# Patient Record
Sex: FEMALE | Race: WHITE | Employment: OTHER | URBAN - METROPOLITAN AREA
[De-identification: names, ages, dates, MRNs, and addresses within clinical notes are randomized per-mention and may not be internally consistent; named-entity substitution may affect disease eponyms.]

---

## 2017-08-14 ENCOUNTER — OFFICE VISIT (OUTPATIENT)
Dept: ORTHOPEDIC SURGERY | Age: 72
End: 2017-08-14

## 2017-08-14 VITALS — WEIGHT: 229.94 LBS | HEIGHT: 68 IN | BODY MASS INDEX: 34.85 KG/M2

## 2017-08-14 DIAGNOSIS — M75.42 IMPINGEMENT SYNDROME OF LEFT SHOULDER: ICD-10-CM

## 2017-08-14 DIAGNOSIS — M75.02 ADHESIVE CAPSULITIS OF LEFT SHOULDER: ICD-10-CM

## 2017-08-14 DIAGNOSIS — M25.512 LEFT SHOULDER PAIN, UNSPECIFIED CHRONICITY: Primary | ICD-10-CM

## 2017-08-14 PROCEDURE — 4040F PNEUMOC VAC/ADMIN/RCVD: CPT | Performed by: ORTHOPAEDIC SURGERY

## 2017-08-14 PROCEDURE — 99213 OFFICE O/P EST LOW 20 MIN: CPT | Performed by: ORTHOPAEDIC SURGERY

## 2017-08-14 PROCEDURE — 1090F PRES/ABSN URINE INCON ASSESS: CPT | Performed by: ORTHOPAEDIC SURGERY

## 2017-08-14 PROCEDURE — G8427 DOCREV CUR MEDS BY ELIG CLIN: HCPCS | Performed by: ORTHOPAEDIC SURGERY

## 2017-08-14 PROCEDURE — 73030 X-RAY EXAM OF SHOULDER: CPT | Performed by: ORTHOPAEDIC SURGERY

## 2017-08-14 PROCEDURE — 3017F COLORECTAL CA SCREEN DOC REV: CPT | Performed by: ORTHOPAEDIC SURGERY

## 2017-08-14 PROCEDURE — G8417 CALC BMI ABV UP PARAM F/U: HCPCS | Performed by: ORTHOPAEDIC SURGERY

## 2017-08-14 PROCEDURE — 3014F SCREEN MAMMO DOC REV: CPT | Performed by: ORTHOPAEDIC SURGERY

## 2017-08-14 PROCEDURE — G8400 PT W/DXA NO RESULTS DOC: HCPCS | Performed by: ORTHOPAEDIC SURGERY

## 2017-08-14 PROCEDURE — 1123F ACP DISCUSS/DSCN MKR DOCD: CPT | Performed by: ORTHOPAEDIC SURGERY

## 2017-08-14 PROCEDURE — 1036F TOBACCO NON-USER: CPT | Performed by: ORTHOPAEDIC SURGERY

## 2017-11-07 ENCOUNTER — HOSPITAL ENCOUNTER (OUTPATIENT)
Dept: MAMMOGRAPHY | Age: 72
Discharge: HOME | End: 2017-11-07
Attending: OBSTETRICS & GYNECOLOGY | Admitting: OBSTETRICS & GYNECOLOGY

## 2017-11-07 DIAGNOSIS — Z12.31 VISIT FOR SCREENING MAMMOGRAM: ICD-10-CM

## 2018-06-19 ENCOUNTER — OFFICE VISIT (OUTPATIENT)
Dept: ORTHOPEDIC SURGERY | Age: 73
End: 2018-06-19

## 2018-06-19 VITALS — WEIGHT: 220 LBS | HEIGHT: 68 IN | BODY MASS INDEX: 33.34 KG/M2

## 2018-06-19 DIAGNOSIS — S62.102A CLOSED FRACTURE OF LEFT WRIST, INITIAL ENCOUNTER: Primary | ICD-10-CM

## 2018-06-19 PROCEDURE — G8427 DOCREV CUR MEDS BY ELIG CLIN: HCPCS | Performed by: ORTHOPAEDIC SURGERY

## 2018-06-19 PROCEDURE — G8417 CALC BMI ABV UP PARAM F/U: HCPCS | Performed by: ORTHOPAEDIC SURGERY

## 2018-06-19 PROCEDURE — 1090F PRES/ABSN URINE INCON ASSESS: CPT | Performed by: ORTHOPAEDIC SURGERY

## 2018-06-19 PROCEDURE — 3017F COLORECTAL CA SCREEN DOC REV: CPT | Performed by: ORTHOPAEDIC SURGERY

## 2018-06-19 PROCEDURE — 99213 OFFICE O/P EST LOW 20 MIN: CPT | Performed by: ORTHOPAEDIC SURGERY

## 2018-06-19 PROCEDURE — G8400 PT W/DXA NO RESULTS DOC: HCPCS | Performed by: ORTHOPAEDIC SURGERY

## 2018-06-19 PROCEDURE — 25600 CLTX DST RDL FX/EPHYS SEP WO: CPT | Performed by: ORTHOPAEDIC SURGERY

## 2018-06-19 PROCEDURE — L3984 UPPER EXT FX ORTHOSIS WRIST: HCPCS | Performed by: ORTHOPAEDIC SURGERY

## 2018-06-19 PROCEDURE — 4040F PNEUMOC VAC/ADMIN/RCVD: CPT | Performed by: ORTHOPAEDIC SURGERY

## 2018-06-19 PROCEDURE — 1123F ACP DISCUSS/DSCN MKR DOCD: CPT | Performed by: ORTHOPAEDIC SURGERY

## 2018-06-19 PROCEDURE — 1036F TOBACCO NON-USER: CPT | Performed by: ORTHOPAEDIC SURGERY

## 2018-06-19 RX ORDER — OXYCODONE HYDROCHLORIDE AND ACETAMINOPHEN 5; 325 MG/1; MG/1
1 TABLET ORAL EVERY 6 HOURS PRN
Qty: 28 TABLET | Refills: 0 | Status: SHIPPED | OUTPATIENT
Start: 2018-06-19 | End: 2018-06-26

## 2018-06-26 ENCOUNTER — OFFICE VISIT (OUTPATIENT)
Dept: ORTHOPEDIC SURGERY | Age: 73
End: 2018-06-26

## 2018-06-26 VITALS — HEIGHT: 67 IN | WEIGHT: 220.02 LBS | BODY MASS INDEX: 34.53 KG/M2

## 2018-06-26 DIAGNOSIS — M25.532 LEFT WRIST PAIN: Primary | ICD-10-CM

## 2018-06-26 DIAGNOSIS — S62.102D CLOSED FRACTURE OF LEFT WRIST WITH ROUTINE HEALING, SUBSEQUENT ENCOUNTER: ICD-10-CM

## 2018-06-26 PROCEDURE — 99024 POSTOP FOLLOW-UP VISIT: CPT | Performed by: ORTHOPAEDIC SURGERY

## 2018-07-17 ENCOUNTER — OFFICE VISIT (OUTPATIENT)
Dept: ORTHOPEDIC SURGERY | Age: 73
End: 2018-07-17

## 2018-07-17 VITALS — HEIGHT: 68 IN | BODY MASS INDEX: 34.86 KG/M2 | WEIGHT: 230 LBS

## 2018-07-17 DIAGNOSIS — S62.102D CLOSED FRACTURE OF LEFT WRIST WITH ROUTINE HEALING, SUBSEQUENT ENCOUNTER: ICD-10-CM

## 2018-07-17 DIAGNOSIS — M25.532 LEFT WRIST PAIN: Primary | ICD-10-CM

## 2018-07-17 PROCEDURE — 99024 POSTOP FOLLOW-UP VISIT: CPT | Performed by: ORTHOPAEDIC SURGERY

## 2018-07-17 RX ORDER — METHYLPREDNISOLONE 4 MG/1
TABLET ORAL
Qty: 1 KIT | Refills: 0 | Status: SHIPPED | OUTPATIENT
Start: 2018-07-17 | End: 2018-07-23

## 2018-07-17 NOTE — PROGRESS NOTES
may consider EMG testing. Brace adjustment today. We have also ordered therapy for a hand and finger range of motion program to include modalities. Her finger stiffness is of quite concerned today area we have discussed moving the fingers previously. This was enforced once again today. He treatments along with range of motion active and passive at home. No strengthening at this time. Follow-up in 1 month unless needed sooner. Repeat x-ray left wrist.  All questions and concerns were addressed today. Patient is in agreement with the plan. Sanam Lynne MD  Hand & Upper Extremity Surgery  [address] [place] [address]  A partner of [facility] [address]        Please note that this transcription was created using voice recognition software. Any errors are unintentional and may be due to voice recognition transcription.

## 2018-07-30 ENCOUNTER — HOSPITAL ENCOUNTER (OUTPATIENT)
Dept: OCCUPATIONAL THERAPY | Age: 73
Setting detail: THERAPIES SERIES
Discharge: HOME | End: 2018-07-30
Payer: MEDICARE

## 2018-07-30 PROCEDURE — 97110 THERAPEUTIC EXERCISES: CPT | Performed by: OCCUPATIONAL THERAPIST

## 2018-07-30 PROCEDURE — G8985 CARRY GOAL STATUS: HCPCS | Performed by: OCCUPATIONAL THERAPIST

## 2018-07-30 PROCEDURE — G8984 CARRY CURRENT STATUS: HCPCS | Performed by: OCCUPATIONAL THERAPIST

## 2018-07-30 PROCEDURE — 97165 OT EVAL LOW COMPLEX 30 MIN: CPT | Performed by: OCCUPATIONAL THERAPIST

## 2018-07-30 NOTE — PLAN OF CARE
graded HEP progression with a good level of effort and compliance. 2) Pt to report a score of 19 % or less on the Quick DASH disability questionnaire for increased performance with carrying, moving, and handling objects. 3) Pt will demonstrate increased ROM to FINGERTIPS touching palm, wrist 50/50 for improved ability to resume driving, lift a pot from the stove. 4) Pt will demonstrate increased strength to  50% of right for improved  and lift items such as a pan. 5) Pt will have a decrease in pain to 2/10 to facilitate improvement in ability to resume driving, lift a pot from the stove. OCCUPATIONAL THERAPY EVALUATION COMPLEXITY JUSTIFICATION:    [x] An occupational profile and medical/therapy history, which includes:   [x] a brief history including medical and/or therapy records relating to the     presenting problem   [] an expanded review of medical and/or therapy records and additional review     of physical, cognitive or psychosocial history related to current functional    performance   [] an extensive additional review of review of medical and/or therapy records   and physical, cognitive, or psychosocial history related to current    functional performance    [x] An assessment that identifies performance deficits (relating to physical, cognitive, or psychosocial skills) that result in activity limitations and/or participation restrictions:   [x] 1-3 performance deficits   [] 3-5 performance deficits   [] 5 or more performance deficits    [x] Clinical decision making of:   [x] low complexity, including analysis of occupational profile, data analysis from problem focused assessment, and consideration of a limited number of treatment options. No comorbidities affect occupational performance. No task modifications or assistance needed to complete evaluation.    [] moderate complexity, including analysis of occupational profile, data analysis from detailed assessment and consideration of several treatment options. Comorbidities that affect occupational performance may be present. Minimal to moderate task modifications or assistance needed to complete assessment. [] high complexity, including analysis of occupational profile, analysis of data from comprehensive assessment and consideration of multiple treatment options. Multiple comorbidities present that affect occupational performance. Significant task modifications or assistance needed to complete assessment.     Evaluation Code:  [x] Low Complexity EVAL 88440 (typically 30 minutes face to face)  [] Mod Complexity EVAL 23399 (typically 45 minutes face to face)  [] High Complexity EVAL 10609 (typically 60 minutes face to face)    Electronically signed by:  Valerio Espino OTR/L, CHT [id number]

## 2018-07-30 NOTE — FLOWSHEET NOTE
[facility], [address] [place] [address]  Phone: [phone]  Fax [phone]  Hand Therapy Daily Treatment Note  Date:  2018    Patient: Moreno Amos   : 1945   MRN: [id number]  Referring Physician: Referring Practitioner: Ian Packer MD       Medical Diagnosis Information:   Diagnosis: S62.102D (ICD-10-CM) - Closed fracture of left wrist with routine healing, subsequent encounter   Treatment Diagnosis: M25.532 (ICD-10-CM) - Left wrist pain     Date of injury:6/15/18  Date of Surgery/Type of procedure: Insurance information:OT Insurance Information: Medicare,    Comorbidities Affecting Functional Performance:     []Anxiety (F41.9)/Depression (F32.9)   []Diabetes Type 1(E10.65) or 2 (E11.65)   []Rheumatoid Arthritis (M05.9)  []Fibromyalgia (M79.7)  []Neuropathy(G60.9)  [x]Osteoarthritis(M19.91)  []None   []Other:    G-code: OT G-codes  Functional Assessment Tool Used: QDASH APPLIED ON 18  Score: 48%  Functional Limitation: Carrying, moving and handling objects  Carrying, Moving and Handling Objects Current Status (): At least 40 percent but less than 60 percent impaired, limited or restricted  Carrying, Moving and Handling Objects Goal Status ():  At least 1 percent but less than 20 percent impaired, limited or restricted    Date of Patient follow up with Physician:  Preferred Language for Healthcare:   [x]English       []other:  Latex Allergy:  [x]NO      []YES  RESTRICTIONS/PRECAUTIONS:  Has  A fib but no pacemaker, progress to prom slowly as pat is fearful- 18      Progress Note: []  Yes  []  No  Next due by : Visit #10       Visit # Insurance Allowable Requires auth   1 medicare    no:[x]                  yes:[]    From 18 to 2018    Pain level: 5 /10     SUBJECTIVE:  Background/Relevant Medical & Therapy History: Patient fell walking through a restaurant at [facility] had immediate pain followed by swelling and bruising and weakness of the left hand and wrist.  reports that all fingers are numb. Skin macerated in thenar crease, patient did not have the orthosis in proper position when she came in, was too far distal and pushing on her 1st cmc which is very painful. She was able to put on properly with instructions prior to leaving today. painful to gently abduct thumb out of palm. Can barely flex her fingers or thumb today today.       OBJECTIVE:    Date:  Hand Dominance:     [x]  Right    [] Left 2018      Objective Measures:     PAIN /10   Quick DASH 48%   Digit  ROM         Index       MP:  PIP:  DIP:                              Long MP:  PIP:  DIP                              Ring MP:  PIP:  DIP                              Small MP:  PIP:  DIP   Digits tip to DPFC in cm Index:10  Lon  Ring:10  Small:9   Thumb ROM Severe limitation   Thumb opposition  unable   Thumb Radial/Palmar abd ROM Limited likely due to long term cmc OA   Wrist ROM Ext/Flex R:65/75  L:35/30   Rad/Uln dev ROM R:  L:   Forearm ROM  Sup/pron R:70/80  L:35/30   Elbow ROM Ext/flex R:  L:not assessed   Shoulder Flex  Shoulder Abd  Shoulder IR/ER     Edema in cm circumf. Index p1 R:6.2  L:7.1   Edema in cm circumf. Wrist R:18.2  L:15.8    strength in lbs R:  L:deferred   Pinch Strengthin lbs: lat  R:  L:   Pinch Strength in lbs:  3 point R:  L:      MMT:           Modalities: 18  Warm water soak              Therapeutic Exercise & Activities:     Foam roll, prom arom digits, arom wrist HEP                                                   Therapeutic Exercise and NMR EXR  [x] (52679) Provided verbal/tactile cueing for activities related to strengthening, flexibility, endurance, ROM  for improvements in scapular, scapulothoracic and UE control with self care, reaching, carrying, lifting, house/yardwork, driving/computer work.     [] No    PLAN: Recommend Occupational Therapy 2 times a week for 12 weeks  [] Continue per plan of care [] Alter current plan (see comments)  [x] Plan of care initiated [] Hold pending MD visit [] Discharge    Plan for next session: progress rom, pain control   Thermal modalities, functional activities and strengthening as inicated, AROM, PROM as indicated    Electronically signed by: Freddie Silverio OTR/L, [address] [place], [place] [id number]

## 2018-08-01 ENCOUNTER — HOSPITAL ENCOUNTER (OUTPATIENT)
Dept: OCCUPATIONAL THERAPY | Age: 73
Setting detail: THERAPIES SERIES
Discharge: HOME | End: 2018-08-01
Payer: MEDICARE

## 2018-08-01 PROCEDURE — 97140 MANUAL THERAPY 1/> REGIONS: CPT | Performed by: OCCUPATIONAL THERAPIST

## 2018-08-01 PROCEDURE — 97112 NEUROMUSCULAR REEDUCATION: CPT | Performed by: OCCUPATIONAL THERAPIST

## 2018-08-01 PROCEDURE — 97022 WHIRLPOOL THERAPY: CPT | Performed by: OCCUPATIONAL THERAPIST

## 2018-08-01 PROCEDURE — 97110 THERAPEUTIC EXERCISES: CPT | Performed by: OCCUPATIONAL THERAPIST

## 2018-08-01 NOTE — FLOWSHEET NOTE
[facility], [address] [place] [address] [place]  Phone: [phone]  Fax [phone]  Hand Therapy Daily Treatment Note  Date:  2018    Patient: Manish Pascal   : 1945   MRN: [id number]  Referring Physician: Referring Practitioner: Naseem Malave MD       Medical Diagnosis Information:   Diagnosis: S62.102D (ICD-10-CM) - Closed fracture of left wrist with routine healing, subsequent encounter   Treatment Diagnosis: M25.532 (ICD-10-CM) - Left wrist pain     Date of injury:6/15/18  Date of Surgery/Type of procedure: Insurance information:OT Insurance Information: Medicare, [facility]   Comorbidities Affecting Functional Performance:     []Anxiety (F41.9)/Depression (F32.9)   []Diabetes Type 1(E10.65) or 2 (E11.65)   []Rheumatoid Arthritis (M05.9)  []Fibromyalgia (M79.7)  []Neuropathy(G60.9)  [x]Osteoarthritis(M19.91)  []None   []Other:    G-code: OT G-codes  Functional Assessment Tool Used: QDASH APPLIED ON 18  Score: 48%  Functional Limitation: Carrying, moving and handling objects  Carrying, Moving and Handling Objects Current Status (): At least 40 percent but less than 60 percent impaired, limited or restricted  Carrying, Moving and Handling Objects Goal Status (): At least 1 percent but less than 20 percent impaired, limited or restricted    Date of Patient follow up with Physician:  Preferred Language for Healthcare:   [x]English       []other:  Latex Allergy:  [x]NO      []YES  RESTRICTIONS/PRECAUTIONS:  Has  A fib but no pacemaker, progress to prom slowly as pat is fearful- 18      Progress Note: []  Yes  []  No  Next due by : Visit #10       Visit # Insurance Allowable Requires auth   2 medicare    no:[x]                  yes:[]    From 18 to 2018    Pain level: 5 /10     SUBJECTIVE:    She still is not putting on her orthosis properly.  Is wearing it listed. [] Progression is slowed due to complexities listed. [] Progression has been slowed due to co-morbidities.   [] Plan just implemented, too soon to assess goals progression  [x] Other: slow progression    ASSESSMENT:    Treatment/Activity Tolerance:  [] Patient tolerated treatment well [] Patient limited by fatique  [x] Patient limited by pain  [] Patient limited by other medical complications  [] Other:     Prognosis: [x] Good [] Fair  [] Poor    Patient Requires Follow-up: [x] Yes  [] No    PLAN: Recommend Occupational Therapy 2 times a week for 11 weeks  [] Continue per plan of care [] Alter current plan (see comments)  [x] Plan of care initiated [] Hold pending MD visit [] Discharge    Plan for next session: progress rom, pain control   Thermal modalities, functional activities and strengthening as inicated, AROM, PROM as indicated    Electronically signed by: Meriel Gitelman OTR/L, [address] [place] [address] [id number]

## 2018-08-06 ENCOUNTER — HOSPITAL ENCOUNTER (OUTPATIENT)
Dept: OCCUPATIONAL THERAPY | Age: 73
Setting detail: THERAPIES SERIES
Discharge: HOME | End: 2018-08-06
Payer: MEDICARE

## 2018-08-06 PROCEDURE — 97140 MANUAL THERAPY 1/> REGIONS: CPT | Performed by: OCCUPATIONAL THERAPIST

## 2018-08-06 PROCEDURE — 97112 NEUROMUSCULAR REEDUCATION: CPT | Performed by: OCCUPATIONAL THERAPIST

## 2018-08-06 PROCEDURE — 97022 WHIRLPOOL THERAPY: CPT | Performed by: OCCUPATIONAL THERAPIST

## 2018-08-06 PROCEDURE — 97110 THERAPEUTIC EXERCISES: CPT | Performed by: OCCUPATIONAL THERAPIST

## 2018-08-06 NOTE — FLOWSHEET NOTE
will know the proper placement of the orthosis. She continues to put it on improperly in spite of being instructed each therapy visit. Thumb and fingers very edematous and part of that is from improper placement by patient of exos orthosis. Therapist reviewed again, she is still wearing it too distal and it is pressing on the cmc area of her thumb, which is already arthritic and painful. Background/Relevant Medical & Therapy History: Patient fell walking through a restaurant at [facility] had immediate pain followed by swelling and bruising and weakness of the left hand and wrist.  reports that all fingers are numb. Skin macerated in thenar crease, patient did not have the orthosis in proper position when she came in, was too far distal and pushing on her 1st cmc which is very painful. She was able to put on properly with instructions prior to leaving today. painful to gently abduct thumb out of palm. Can barely flex her fingers or thumb today today.       OBJECTIVE:    Date:  Hand Dominance:     [x]  Right    [] Left 2018    Objective Measures:      PAIN 5/10    Quick DASH 48%    Digit  ROM         Index       MP:  PIP:  DIP: /45  /35  /05                              Long MP:  PIP:  DIP /55  /15  /+15                              Ring MP:  PIP:  DIP /30  /35  /+15                              Small MP:  PIP:  DIP /20  /50  /10   Digits tip to DPFC in cm Index:10  Lon  Ring:10  Small:9 9  10  8  7.5   Thumb ROM Severe limitation    Thumb opposition  unable    Thumb Radial/Palmar abd ROM Limited likely due to long term cmc OA    Wrist ROM Ext/Flex R:65/75  L:35/30   45/35   Rad/Uln dev ROM R:  L:    Forearm ROM  Sup/pron R:70/80  L:35/30    Elbow ROM Ext/flex R:  L:not assessed    Shoulder Flex  Shoulder Abd  Shoulder IR/ER      Edema in cm circumf. Index p1 R:6.2  L:7.1    Edema in cm circumf.   Wrist R:18.2  L:15.8     strength in lbs R:  L:deferred    Pinch Strengthin lbs: with self care, reaching, carrying, lifting, house/yardwork, driving/computer work    Manual Treatments: retro grade and gentle soft tissue mobs hand 8/6/18     [x] ([id number]) Provided manual therapy to mobilize soft tissue/joints of the UE for the purpose of modulating pain, promoting relaxation,  increasing ROM, reducing/eliminating soft tissue swelling/inflammation/restriction, improving soft tissue extensibility and allowing for proper ROM for normal function with self care, reaching, carrying, lifting, house/yardwork, driving/computer work    Splinting:  [] Fabrication of: L-code-finger flexion cuff, used stockinet and velcro to flex all fingers at the same time.   [] (22900) Checkout for orthotic/prosthetic use, established patient   [] (64467) Orthotic management and training (fitting and assessment)  [x] Comments:helped her put on exos brace properly again, marked proximal forearm with a sharpie pen to ensure proper fit 8/6/18   helped her put on exos brace properly 8/1/18     Charges:  Timed Code Treatment Minutes: 45   Total Treatment Minutes: 60   [] EVAL (LOW) 90410   [] OT Re-eval (33624)  [] EVAL (MOD) 22371   [] EVAL (HIGH) 65797       [x] Jennifer (02138) x  1   [] EMHJA(49234)  [x] NMR (28399) x  1   [] Estim (attended) (78846)   [x] Manual (58999) x  1    [] US (45814)  [] TA (40452) x      [] Paraffin (86164)  [] ADL  (50222) x     [] Splint/L code:    [] Estim (unattended) (87058)  [] Other:  [x] Fluidotherapy (93529)  [](40878) Checkout for orthotic use, established patient x       [] (73689) Orthotic mgmt & training  x        GOALS:Short Term Goals: To be achieved in: 2 weeks  1. Independent in HEP and progression per patient tolerance, in order to prevent re-injury.    2. Patient will have a decrease in pain to facilitate improvement in movement, function, and ADLs as indicated by Functional Deficits.     Long Term Goals to be achieved in 12  weeks, including patient directed goals to address identified performance deficits:  1) Pt to be independent in graded HEP progression with a good level of effort and compliance. 2) Pt to report a score of 19 % or less on the Quick DASH disability questionnaire for increased performance with carrying, moving, and handling objects. 3) Pt will demonstrate increased ROM to FINGERTIPS touching palm, wrist 50/50 for improved ability to resume driving, lift a pot from the stove. 4) Pt will demonstrate increased strength to  50% of right for improved  and lift items such as a pan. 5) Pt will have a decrease in pain to 2/10 to facilitate improvement in ability to resume driving, lift a pot from the stove.       Progression Towards Functional goals:  [] Patient is progressing as expected towards functional goals listed. [] Progression is slowed due to complexities listed. [] Progression has been slowed due to co-morbidities. [] Plan just implemented, too soon to assess goals progression  [x] Other: slow progression    ASSESSMENT:  Hand edema is a significant limitation, is making progress overall with motion. Still very limited in functional use of left hand.    Treatment/Activity Tolerance:  [] Patient tolerated treatment well [] Patient limited by fatique  [x] Patient limited by pain  [] Patient limited by other medical complications  [] Other:     Prognosis: [x] Good [] Fair  [] Poor    Patient Requires Follow-up: [x] Yes  [] No    PLAN: Recommend Occupational Therapy 2 times a week for 11 weeks  [] Continue per plan of care [] Mendel Jude current plan (see comments)  [x] Plan of care initiated [] Hold pending MD visit [] Discharge    Plan for next session: progress rom, pain control   Thermal modalities, functional activities and strengthening as inicated, AROM, PROM as indicated    Electronically signed by: Donald Henderson OTR/L, [place] [id number]

## 2018-08-08 ENCOUNTER — HOSPITAL ENCOUNTER (OUTPATIENT)
Dept: OCCUPATIONAL THERAPY | Age: 73
Setting detail: THERAPIES SERIES
Discharge: HOME | End: 2018-08-08
Payer: MEDICARE

## 2018-08-08 PROCEDURE — 97110 THERAPEUTIC EXERCISES: CPT | Performed by: OCCUPATIONAL THERAPIST

## 2018-08-08 PROCEDURE — 97140 MANUAL THERAPY 1/> REGIONS: CPT | Performed by: OCCUPATIONAL THERAPIST

## 2018-08-08 PROCEDURE — 97022 WHIRLPOOL THERAPY: CPT | Performed by: OCCUPATIONAL THERAPIST

## 2018-08-08 PROCEDURE — 97112 NEUROMUSCULAR REEDUCATION: CPT | Performed by: OCCUPATIONAL THERAPIST

## 2018-08-14 ENCOUNTER — OFFICE VISIT (OUTPATIENT)
Dept: ORTHOPEDIC SURGERY | Age: 73
End: 2018-08-14

## 2018-08-14 VITALS
HEIGHT: 68 IN | SYSTOLIC BLOOD PRESSURE: 128 MMHG | WEIGHT: 230 LBS | DIASTOLIC BLOOD PRESSURE: 69 MMHG | BODY MASS INDEX: 34.86 KG/M2 | HEART RATE: 75 BPM

## 2018-08-14 DIAGNOSIS — M25.532 LEFT WRIST PAIN: Primary | ICD-10-CM

## 2018-08-14 DIAGNOSIS — S62.102D CLOSED FRACTURE OF LEFT WRIST WITH ROUTINE HEALING, SUBSEQUENT ENCOUNTER: ICD-10-CM

## 2018-08-14 PROCEDURE — L3918 METACARP FX ORTHOSIS PRE OTS: HCPCS | Performed by: ORTHOPAEDIC SURGERY

## 2018-08-14 PROCEDURE — 99024 POSTOP FOLLOW-UP VISIT: CPT | Performed by: ORTHOPAEDIC SURGERY

## 2018-08-14 NOTE — PROGRESS NOTES
Chief Complaint   Patient presents with    Follow-up      Left wrist distal radius fx DOI 6/15/2018 non op       HISTORY OF PRESENT ILLNESS:  Alexander Mcgrath is a 75 y.o.  patient here for repeat x-ray evaluation after sustaining a significant fracture of the left wrist.  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 There was quite concern at last visit for finger stiffness. There has been some concern for her understanding and compliance he of brace use. She still has some tingling of the radial digits, consistent with posttraumatic carpal tunnel syndrome. She has not had surgery for carpal tunnel syndrome in the past but states that she had chiropractic manipulation approximately 15 years ago which helped immensely with symptoms. ROS:  ROS neg     Past medical history is reviewed again today, no changes to report    PHYSICAL EXAMINATION:  Patient is alert and pleasant, in no acute distress. The affected extremity is examined today. Left hand and wrist still reveal moderate swelling of the fingers with significant stiffness, she is unable to make a fist.  Wrist swelling is more mild today, dorsal skin wrinkles are returning. 25° of both wrist flexion and extension without abnormal pain or crepitance. Discomfort with Tinel testing. She reports decreased light touch sensation volar thumb index middle and ring finger. There is  weakness but motor movements are present. No obvious wasting or thenar atrophy    X-rays:  3 views, left wrist, impression: Interval callus formation distal radius. No apparent new displacement. Some radial shortening as prior. Neutral alignment on the lateral.    IMPRESSION AND PLAN: Left distal radius fracture, left wrist pain, finger numbness  We will continue with our current care plan. Conservative measures at this point.   I do not recommend surgery for the finger stiffness and swelling, this would likely make it

## 2018-08-15 ENCOUNTER — HOSPITAL ENCOUNTER (OUTPATIENT)
Dept: OCCUPATIONAL THERAPY | Age: 73
Setting detail: THERAPIES SERIES
Discharge: HOME | End: 2018-08-15
Payer: MEDICARE

## 2018-08-15 PROCEDURE — 97140 MANUAL THERAPY 1/> REGIONS: CPT | Performed by: OCCUPATIONAL THERAPIST

## 2018-08-15 PROCEDURE — 97110 THERAPEUTIC EXERCISES: CPT | Performed by: OCCUPATIONAL THERAPIST

## 2018-08-15 PROCEDURE — 97018 PARAFFIN BATH THERAPY: CPT | Performed by: OCCUPATIONAL THERAPIST

## 2018-08-15 NOTE — FLOWSHEET NOTE
prior to leaving today. painful to gently abduct thumb out of palm. Can barely flex her fingers or thumb today today.       OBJECTIVE:    Date:  Hand Dominance:     [x]  Right    [] Left 2018    Objective Measures:      PAIN 5/10    Quick DASH 48%    Digit  ROM         Index       MP:  PIP:  DIP: /45  /35  /05                              Long MP:  PIP:  DIP /55  /15  /+15                              Ring MP:  PIP:  DIP /30  /35  /+15                              Small MP:  PIP:  DIP /20  /50  /10   Digits tip to DPFC in cm Index:10  Lon  Ring:10  Small:9 9  10  8  7.5   Thumb ROM Severe limitation    Thumb opposition  unable index   Thumb Radial/Palmar abd ROM Limited likely due to long term cmc OA    Wrist ROM Ext/Flex R:65/75  L:35/   45/35   Rad/Uln dev ROM R:  L:    Forearm ROM  Sup/pron R:70/80  L:35/30    Elbow ROM Ext/flex R:  L:not assessed    Shoulder Flex  Shoulder Abd  Shoulder IR/ER      Edema in cm circumf. Index p1 R:6.2  L:7.1    Edema in cm circumf.   Wrist R:18.2  L:15.8     strength in lbs R:  L:deferred    Pinch Strengthin lbs: lat  R:  L:    Pinch Strength in lbs:  3 point R:  L:       MMT:            Modalities: Has a-fib but no pacemaker 7/30/18   8/1/18  8/6/18  8/8/18  8/15/18    Contrast bath    5 cycles     Warm water soak       fluidotherapy  15 15 15 at end    paraffin     12 min(suggested to patient by MD)   Therapeutic Exercise & Activities:        Foam roll, prom arom digits, arom wrist HEP   Prom digits to tolerance (is painful), aarom wrist and forearm Much more painful today due to swelling           Finger flex strap  10 min      Foam block   Alt fingers w thumb      Whyte grasp  cuing      Fine motor manip  Beans, sponges, added to HEP  Foam blocks bilateral    pulleys    For whole arom movement to help with edema    finisher   No wt- diagonals, ladder  Pulleys and finisher for whole arm use (edema control)   putty   Yellow- roll, attempted to pinch Roll Finger tip press for flexion, roll, pinch   Edema control   k-tape keyhole cut volar and dorsal to elbow, multi cut radial wrist, supportive strip at 1st cmc, coban wrap middle, compression sleeve index, ring and small k tape dorsal hand for edema, 1/2 inch strips, 2 pieces, x-small edema glove issued k -tape as per last visit, edema glove and also made a chip bag to wear in palm under glove for edema, and to encourage her to flex fingers around. Therapeutic Exercise and NMR EXR- cuing needed, limited actively by hand numbness 8/1/18   [x] (64046) Provided verbal/tactile cueing for activities related to strengthening, flexibility, endurance, ROM  for improvements in scapular, scapulothoracic and UE control with self care, reaching, carrying, lifting, house/yardwork, driving/computer work. [x] (76377) Provided verbal/tactile cueing for activities related to improving balance, coordination, kinesthetic sense, posture, motor skill, proprioception  to assist with  scapular, scapulothoracic and UE control with self care, reaching, carrying, lifting, house/yardwork, driving/computer work. Therapeutic Activities:    [] ( 64953) therapeutic activities, direct (one on one) patient contact. Use of dynamic activities to improve functional performance.     Activities of Daily Living:  [] (04397) Provided self-care/home management training (i.e., activities of daily living and compensatory training, meal preparation, safety procedures, and instructions in use of assistive technology devices/adaptive equipment)     Home Exercise Program:  Added to HEP copy in chart 8/1/18   [] (51926) Reviewed/Progressed HEP activities related to strengthening, flexibility, endurance, ROM of scapular, scapulothoracic and UE control with self care, reaching, carrying, lifting, house/yardwork, driving/computer work    Manual Treatments: retro grade and gentle soft tissue mobs hand 8/6/18     [x] ([id number]) Provided manual therapy to mobilize soft tissue/joints of the UE for the purpose of modulating pain, promoting relaxation,  increasing ROM, reducing/eliminating soft tissue swelling/inflammation/restriction, improving soft tissue extensibility and allowing for proper ROM for normal function with self care, reaching, carrying, lifting, house/yardwork, driving/computer work    Splinting:  [] Fabrication of: L-code-finger flexion cuff, used stockinet and velcro to flex all fingers at the same time.   [] (02200) Checkout for orthotic/prosthetic use, established patient   [] (44489) Orthotic management and training (fitting and assessment)  [x] Comments:helped her put on exos brace properly again, marked proximal forearm with a sharpie pen to ensure proper fit 8/6/18   helped her put on exos brace properly 8/1/18     Charges:  Timed Code Treatment Minutes: 45   Total Treatment Minutes: 70   [] EVAL (LOW) 89201   [] OT Re-eval (98220)  [] EVAL (MOD) 82027   [] EVAL (HIGH) 34778       [x] Jennifer (53808) x  2   [] IVBMB(21667)  [] NMR (37147) x      [] Estim (attended) (30122)   [x] Manual (60833) x  1    [] US (19105)  [] TA (31418) x      [x] Paraffin (78496)  [] ADL  (98616) x     [] Splint/L code:    [] Estim (unattended) (07941)  [] Other:  [] Fluidotherapy (76201)  [](07268) Checkout for orthotic use, established patient x       [] (69389) Orthotic mgmt & training  x        GOALS:Short Term Goals: To be achieved in: 2 weeks  1. Independent in HEP and progression per patient tolerance, in order to prevent re-injury. 2. Patient will have a decrease in pain to facilitate improvement in movement, function, and ADLs as indicated by Functional Deficits.     Long Term Goals to be achieved in 12  weeks, including patient directed goals to address identified performance deficits:  1) Pt to be independent in graded HEP progression with a good level of effort and compliance.   2) Pt to report a score of 19 % or less on the Quick DASH

## 2018-08-20 ENCOUNTER — HOSPITAL ENCOUNTER (OUTPATIENT)
Dept: OCCUPATIONAL THERAPY | Age: 73
Setting detail: THERAPIES SERIES
Discharge: HOME | End: 2018-08-20
Payer: MEDICARE

## 2018-08-20 PROCEDURE — 97110 THERAPEUTIC EXERCISES: CPT | Performed by: OCCUPATIONAL THERAPIST

## 2018-08-20 PROCEDURE — 97140 MANUAL THERAPY 1/> REGIONS: CPT | Performed by: OCCUPATIONAL THERAPIST

## 2018-08-20 PROCEDURE — 97022 WHIRLPOOL THERAPY: CPT | Performed by: OCCUPATIONAL THERAPIST

## 2018-08-20 NOTE — FLOWSHEET NOTE
use of assistive technology devices/adaptive equipment)     Home Exercise Program:  Added to HEP copy in chart 8/1/18   [] (88662) Reviewed/Progressed HEP activities related to strengthening, flexibility, endurance, ROM of scapular, scapulothoracic and UE control with self care, reaching, carrying, lifting, house/yardwork, driving/computer work    Manual Treatments: retro grade and gentle soft tissue mobs hand     [x] ([id number]) Provided manual therapy to mobilize soft tissue/joints of the UE for the purpose of modulating pain, promoting relaxation,  increasing ROM, reducing/eliminating soft tissue swelling/inflammation/restriction, improving soft tissue extensibility and allowing for proper ROM for normal function with self care, reaching, carrying, lifting, house/yardwork, driving/computer work    Splinting:  [] Fabrication of: L-code-finger flexion cuff, used stockinet and velcro to flex all fingers at the same time.   [] (79004) Checkout for orthotic/prosthetic use, established patient   [] (63690) Orthotic management and training (fitting and assessment)  [] Comments:helped her put on exos brace properly again, marked proximal forearm with a sharpie pen to ensure proper fit 8/6/18   helped her put on exos brace properly 8/1/18     Charges:  Timed Code Treatment Minutes: 45   Total Treatment Minutes: 70   [] EVAL (LOW) 25310   [] OT Re-eval (35096)  [] EVAL (MOD) 68205   [] EVAL (HIGH) 77699       [x] Jennifer (12576) x  2   [] GLZEZ(65949)  [] NMR (63965) x      [] Estim (attended) (52101)   [x] Manual ([id number]) x  1    [] US (66102)  [] TA (88109) x      [] Paraffin (13186)  [] ADL  (88 [phone] 24 60) x     [] Splint/L code:    [] Estim (unattended) (37069)  [] Other:  [x] Fluidotherapy (42303)  [](74262) Checkout for orthotic use, established patient x       [] (60332) Orthotic mgmt & training  x        GOALS:Short Term Goals: To be achieved in: 2 weeks  1.  Independent in HEP and progression per patient tolerance, in order to prevent re-injury. 2. Patient will have a decrease in pain to facilitate improvement in movement, function, and ADLs as indicated by Functional Deficits.     Long Term Goals to be achieved in 12  weeks, including patient directed goals to address identified performance deficits:  1) Pt to be independent in graded HEP progression with a good level of effort and compliance. 2) Pt to report a score of 19 % or less on the Quick DASH disability questionnaire for increased performance with carrying, moving, and handling objects. 3) Pt will demonstrate increased ROM to FINGERTIPS touching palm, wrist 50/50 for improved ability to resume driving, lift a pot from the stove. 4) Pt will demonstrate increased strength to  50% of right for improved  and lift items such as a pan. 5) Pt will have a decrease in pain to 2/10 to facilitate improvement in ability to resume driving, lift a pot from the stove.     Progression Towards Functional goals:  [] Patient is progressing as expected towards functional goals listed. [] Progression is slowed due to complexities listed. [] Progression has been slowed due to co-morbidities. [] Plan just implemented, too soon to assess goals progression  [x] Other: slow progression-persistent edema, is painful    ASSESSMENT:  Since temporarily stopping use of cmc brace her digit and dorsal hand edema has improved. Still very limited in functional use of left hand, much encouragement needed to her to use her left hand.       Treatment/Activity Tolerance:  [] Patient tolerated treatment well [] Patient limited by fatique  [x] Patient limited by pain and edema  [] Patient limited by other medical complications  [] Other:     Prognosis: [x] Good [] Fair  [] Poor    Patient Requires Follow-up: [x] Yes  [] No    PLAN: Recommend Occupational Therapy 2 times a week for 8 weeks  [x] Continue per plan of care [] Alter current plan (see comments)  [] Plan of care initiated [] Hold pending MD visit [] Discharge    Plan for next session: progress rom, pain control   Thermal modalities, functional activities and strengthening as inicated, AROM, PROM as indicated    Electronically signed by: Chloe Harrington OTR/BRIDGET, ELLE [id number]

## 2018-08-22 ENCOUNTER — HOSPITAL ENCOUNTER (OUTPATIENT)
Dept: OCCUPATIONAL THERAPY | Age: 73
Setting detail: THERAPIES SERIES
Discharge: HOME | End: 2018-08-22
Payer: MEDICARE

## 2018-08-22 PROCEDURE — 97022 WHIRLPOOL THERAPY: CPT | Performed by: OCCUPATIONAL THERAPIST

## 2018-08-22 PROCEDURE — 97110 THERAPEUTIC EXERCISES: CPT | Performed by: OCCUPATIONAL THERAPIST

## 2018-08-22 PROCEDURE — 97140 MANUAL THERAPY 1/> REGIONS: CPT | Performed by: OCCUPATIONAL THERAPIST

## 2018-08-22 NOTE — FLOWSHEET NOTE
flexion. Is not using her hand at home because it hurts too much. Is going to see the chiropractor next Thursday. 8/15/18 Thumb and index are the most numb, middle is improving, ring and small are almost back to normal. MD gave her a comfort cool neoprene thumb orthosis yesterday. She did not take it off since and today she arrived at therapy with the fingers and thumb  very edematous distal to the brace. Will educate her on making sure the brace is not too tight around her wrist. She may benefit from the cmc brace more in the future when she has more functional use of hand and needs the thumb support. Did tell her to leave the brace off for now and really try and use her hand. She wanted to wear the exos brace and therapist discouraged her from doing so as her hand is so stiff and swollen that use of the brace is seeming to make this worse. 8/6/18 Thumb is not as painful today, tolerated taping for edema, finger wrapping worked well, hand still edematous. She is progressing very slowly, edema is persistent and pain tolerance limits how much she can tolerate in therapy. She remains optimistic and thinks going to her chiropractor will help with her carpal tunnel symptoms. 8/6/18 Offered to make a olesya on her forearm with a Sharpie pen so she will know the proper placement of the orthosis. She continues to put it on improperly in spite of being instructed each therapy visit. Thumb and fingers very edematous and part of that is from improper placement by patient of exos orthosis. Therapist reviewed again, she is still wearing it too distal and it is pressing on the cmc area of her thumb, which is already arthritic and painful. Background/Relevant Medical & Therapy History: Patient fell walking through a restaurant at [facility] had immediate pain followed by swelling and bruising and weakness of the left hand and wrist.  reports that all fingers are numb.  Skin macerated in thenar crease, Daily Living:  [] (89245) Provided self-care/home management training (i.e., activities of daily living and compensatory training, meal preparation, safety procedures, and instructions in use of assistive technology devices/adaptive equipment)     Home Exercise Program:  Added to HEP copy in chart 8/1/18   [] (21726) Reviewed/Progressed HEP activities related to strengthening, flexibility, endurance, ROM of scapular, scapulothoracic and UE control with self care, reaching, carrying, lifting, house/yardwork, driving/computer work    Manual Treatments: retro grade and gentle soft tissue mobs hand     [x] ([id number]) Provided manual therapy to mobilize soft tissue/joints of the UE for the purpose of modulating pain, promoting relaxation,  increasing ROM, reducing/eliminating soft tissue swelling/inflammation/restriction, improving soft tissue extensibility and allowing for proper ROM for normal function with self care, reaching, carrying, lifting, house/yardwork, driving/computer work    Splinting:  [] Fabrication of: L-code-finger flexion cuff, used stockinet and velcro to flex all fingers at the same time.   [] (52578) Checkout for orthotic/prosthetic use, established patient   [] (14826) Orthotic management and training (fitting and assessment)  [] Comments:helped her put on exos brace properly again, marked proximal forearm with a sharpie pen to ensure proper fit 8/6/18   helped her put on exos brace properly 8/1/18     Charges:  Timed Code Treatment Minutes: 45   Total Treatment Minutes: 70   [] EVAL (LOW) 72840   [] OT Re-eval (89830)  [] EVAL (MOD) 07732   [] EVAL (HIGH) 74380       [x] Jennifer (41962) x  2   [] RRQVW(11112)  [] NMR (81804) x      [] Estim (attended) (65277)   [x] Manual ([id number]) x  1    [] US (27246)  [] TA (99050) x      [] Paraffin (52027)  [] ADL  (88 649 24 60) x     [] Splint/L code:    [] Estim (unattended) (96703)  [] Other:  [x] Fluidotherapy (38725)  [](10246) Checkout for orthotic use, established patient x       [] (27238) Orthotic mgmt & training  x        GOALS:Short Term Goals: To be achieved in: 2 weeks  1. Independent in HEP and progression per patient tolerance, in order to prevent re-injury. 2. Patient will have a decrease in pain to facilitate improvement in movement, function, and ADLs as indicated by Functional Deficits.     Long Term Goals to be achieved in 12  weeks, including patient directed goals to address identified performance deficits:  1) Pt to be independent in graded HEP progression with a good level of effort and compliance. 2) Pt to report a score of 19 % or less on the Quick DASH disability questionnaire for increased performance with carrying, moving, and handling objects. 3) Pt will demonstrate increased ROM to FINGERTIPS touching palm, wrist 50/50 for improved ability to resume driving, lift a pot from the stove. 4) Pt will demonstrate increased strength to  50% of right for improved  and lift items such as a pan. 5) Pt will have a decrease in pain to 2/10 to facilitate improvement in ability to resume driving, lift a pot from the stove.     Progression Towards Functional goals:  [] Patient is progressing as expected towards functional goals listed. [] Progression is slowed due to complexities listed. [] Progression has been slowed due to co-morbidities. [] Plan just implemented, too soon to assess goals progression  [x] Other: slow progression-persistent edema, is painful    ASSESSMENT:  Since temporarily stopping use of cmc brace her digit and dorsal hand edema has improved. Still very limited in functional use of left hand, much encouragement needed to her to use her left hand.       Treatment/Activity Tolerance:  [] Patient tolerated treatment well [] Patient limited by fatique  [x] Patient limited by pain and edema  [] Patient limited by other medical complications  [] Other:     Prognosis: [x] Good [] Fair  [] Poor    Patient Requires Follow-up: [x]

## 2018-08-24 ENCOUNTER — APPOINTMENT (OUTPATIENT)
Dept: OCCUPATIONAL THERAPY | Age: 73
End: 2018-08-24
Payer: MEDICARE

## 2018-08-27 ENCOUNTER — HOSPITAL ENCOUNTER (OUTPATIENT)
Dept: OCCUPATIONAL THERAPY | Age: 73
Setting detail: THERAPIES SERIES
Discharge: HOME | End: 2018-08-27
Payer: MEDICARE

## 2018-08-27 PROCEDURE — 97140 MANUAL THERAPY 1/> REGIONS: CPT | Performed by: OCCUPATIONAL THERAPIST

## 2018-08-27 PROCEDURE — 97022 WHIRLPOOL THERAPY: CPT | Performed by: OCCUPATIONAL THERAPIST

## 2018-08-27 PROCEDURE — 97110 THERAPEUTIC EXERCISES: CPT | Performed by: OCCUPATIONAL THERAPIST

## 2018-08-27 PROCEDURE — 97112 NEUROMUSCULAR REEDUCATION: CPT | Performed by: OCCUPATIONAL THERAPIST

## 2018-08-27 NOTE — FLOWSHEET NOTE
Provided verbal/tactile cueing for activities related to improving balance, coordination, kinesthetic sense, posture, motor skill, proprioception  to assist with  scapular, scapulothoracic and UE control with self care, reaching, carrying, lifting, house/yardwork, driving/computer work. -cuing for wrist position. 8/27/18       Therapeutic Activities:    [] ( 59028) therapeutic activities, direct (one on one) patient contact. Use of dynamic activities to improve functional performance.     Activities of Daily Living:  [] (21495) Provided self-care/home management training (i.e., activities of daily living and compensatory training, meal preparation, safety procedures, and instructions in use of assistive technology devices/adaptive equipment)     Home Exercise Program:  Added to HEP copy in chart 8/1/18   [] (81263) Reviewed/Progressed HEP activities related to strengthening, flexibility, endurance, ROM of scapular, scapulothoracic and UE control with self care, reaching, carrying, lifting, house/yardwork, driving/computer work    Manual Treatments: retro grade and gentle soft tissue mobs hand     [x] ([id number]) Provided manual therapy to mobilize soft tissue/joints of the UE for the purpose of modulating pain, promoting relaxation,  increasing ROM, reducing/eliminating soft tissue swelling/inflammation/restriction, improving soft tissue extensibility and allowing for proper ROM for normal function with self care, reaching, carrying, lifting, house/yardwork, driving/computer work    Splinting:  [] Fabrication of: L-code-finger flexion cuff, used stockinet and velcro to flex all fingers at the same time.   [] (95913) Checkout for orthotic/prosthetic use, established patient   [] (27771) Orthotic management and training (fitting and assessment)  [] Comments:helped her put on exos brace properly again, marked proximal forearm with a sharpie pen to ensure proper fit 8/6/18   helped her put on exos brace properly 8/1/18

## 2018-08-29 ENCOUNTER — APPOINTMENT (OUTPATIENT)
Dept: OCCUPATIONAL THERAPY | Age: 73
End: 2018-08-29
Payer: MEDICARE

## 2018-08-31 ENCOUNTER — HOSPITAL ENCOUNTER (OUTPATIENT)
Dept: OCCUPATIONAL THERAPY | Age: 73
Setting detail: THERAPIES SERIES
Discharge: HOME | End: 2018-08-31
Payer: MEDICARE

## 2018-08-31 PROCEDURE — 97140 MANUAL THERAPY 1/> REGIONS: CPT | Performed by: OCCUPATIONAL THERAPIST

## 2018-08-31 PROCEDURE — 97022 WHIRLPOOL THERAPY: CPT | Performed by: OCCUPATIONAL THERAPIST

## 2018-08-31 PROCEDURE — 97110 THERAPEUTIC EXERCISES: CPT | Performed by: OCCUPATIONAL THERAPIST

## 2018-08-31 NOTE — FLOWSHEET NOTE
[facility], [address] [place] [address]  Phone: [phone]  Fax [phone]  Hand Therapy Daily Treatment Note  Date:  2018    Patient: Daniel Bender   : 1945   MRN: [id number]  Referring Physician: Referring Practitioner: Cheko Meek MD       Medical Diagnosis Information:   Diagnosis: S62.102D (ICD-10-CM) - Closed fracture of left wrist with routine healing, subsequent encounter   Treatment Diagnosis: M25.532 (ICD-10-CM) - Left wrist pain     Date of injury:6/15/18  Date of Surgery/Type of procedure: Insurance information:OT Insurance Information: Medicare, [facility]   Comorbidities Affecting Functional Performance:     []Anxiety (F41.9)/Depression (F32.9)   []Diabetes Type 1(E10.65) or 2 (E11.65)   []Rheumatoid Arthritis (M05.9)  []Fibromyalgia (M79.7)  []Neuropathy(G60.9)  [x]Osteoarthritis(M19.91)  []None   []Other:    G-code: OT G-codes  Functional Assessment Tool Used: QDASH APPLIED ON 18  Score: 48%  Functional Limitation: Carrying, moving and handling objects  Carrying, Moving and Handling Objects Current Status (): At least 40 percent but less than 60 percent impaired, limited or restricted  Carrying, Moving and Handling Objects Goal Status (): At least 1 percent but less than 20 percent impaired, limited or restricted    Date of Patient follow up with Physician:  Preferred Language for Healthcare:   [x]English       []other:  Latex Allergy:  [x]NO      []YES  RESTRICTIONS/PRECAUTIONS:  Has  A fib but no pacemaker, progress to prom slowly as pat is fearful- 18      Progress Note: []  Yes  []  No  Next due by : Visit #10       Visit # Insurance Allowable Requires auth   8 medicare    no:[x]                  yes:[]    From 18 to 2018    Pain level: 2/10 much more painful with PROM      SUBJECTIVE:   I can not drive yet.  I don't trust Other:  [x] Fluidotherapy (15087)  [](22553) Checkout for orthotic use, established patient x       [] (37519) Orthotic mgmt & training  x        GOALS:Short Term Goals: To be achieved in: 2 weeks  1. Independent in HEP and progression per patient tolerance, in order to prevent re-injury. 2. Patient will have a decrease in pain to facilitate improvement in movement, function, and ADLs as indicated by Functional Deficits.     Long Term Goals to be achieved in 12  weeks, including patient directed goals to address identified performance deficits:  1) Pt to be independent in graded HEP progression with a good level of effort and compliance. 2) Pt to report a score of 19 % or less on the Quick DASH disability questionnaire for increased performance with carrying, moving, and handling objects. 3) Pt will demonstrate increased ROM to FINGERTIPS touching palm, wrist 50/50 for improved ability to resume driving, lift a pot from the stove. 4) Pt will demonstrate increased strength to  50% of right for improved  and lift items such as a pan. 5) Pt will have a decrease in pain to 2/10 to facilitate improvement in ability to resume driving, lift a pot from the stove.     Progression Towards Functional goals:  [x] Patient is progressing as expected towards functional goals listed. [] Progression is slowed due to complexities listed. [] Progression has been slowed due to co-morbidities. [] Plan just implemented, too soon to assess goals progression  [] Other: slow progression-persistent edema, is painful    ASSESSMENT:  Edema and digit rom are better after therapy session. Patient admits to being afraid she will hurt her hand and not using the left hand very much at home. Will continue to encourage her to mindfully use the left hand and remember how much better the hand moves and looks after therapy session.     Treatment/Activity Tolerance:  [] Patient tolerated treatment well [] Patient limited by dominique  [x] Patient limited by pain and edema  [] Patient limited by other medical complications  [] Other:     Prognosis: [x] Good [] Fair  [] Poor    Patient Requires Follow-up: [x] Yes  [] No    PLAN: Recommend Occupational Therapy 2 times a week for 7 weeks  [x] Continue per plan of care [] Alter current plan (see comments)  [] Plan of care initiated [] Hold pending MD visit [] Discharge    Plan for next session: progress rom, pain control, encourage functional use of hand   Thermal modalities, functional activities and strengthening as inicated, AROM, PROM as indicated    Electronically signed by: Judith Floyd OTR/L [id number]

## 2018-09-05 ENCOUNTER — HOSPITAL ENCOUNTER (OUTPATIENT)
Dept: OCCUPATIONAL THERAPY | Age: 73
Setting detail: THERAPIES SERIES
Discharge: HOME | End: 2018-09-05
Payer: MEDICARE

## 2018-09-05 PROCEDURE — 97140 MANUAL THERAPY 1/> REGIONS: CPT | Performed by: OCCUPATIONAL THERAPIST

## 2018-09-05 PROCEDURE — 97022 WHIRLPOOL THERAPY: CPT | Performed by: OCCUPATIONAL THERAPIST

## 2018-09-05 PROCEDURE — 97112 NEUROMUSCULAR REEDUCATION: CPT | Performed by: OCCUPATIONAL THERAPIST

## 2018-09-05 PROCEDURE — 97110 THERAPEUTIC EXERCISES: CPT | Performed by: OCCUPATIONAL THERAPIST

## 2018-09-05 NOTE — FLOWSHEET NOTE
[facility], [address] [place] [address] [place]  Phone: [phone]  Fax [phone]  Hand Therapy Daily Treatment Note  Date:  2018    Patient: Lunette Cogan   : 1945   MRN: [id number]  Referring Physician: Referring Practitioner: Lucy Alejandre MD       Medical Diagnosis Information:   Diagnosis: S62.102D (ICD-10-CM) - Closed fracture of left wrist with routine healing, subsequent encounter   Treatment Diagnosis: M25.532 (ICD-10-CM) - Left wrist pain     Date of injury:6/15/18  Date of Surgery/Type of procedure: Insurance information:OT Insurance Information: Medicare,    Comorbidities Affecting Functional Performance:     []Anxiety (F41.9)/Depression (F32.9)   []Diabetes Type 1(E10.65) or 2 (E11.65)   []Rheumatoid Arthritis (M05.9)  []Fibromyalgia (M79.7)  []Neuropathy(G60.9)  [x]Osteoarthritis(M19.91)  []None   []Other:    G-code: OT G-codes  Functional Assessment Tool Used: QDASH APPLIED ON 18  Score: 48%  Functional Limitation: Carrying, moving and handling objects  Carrying, Moving and Handling Objects Current Status (): At least 40 percent but less than 60 percent impaired, limited or restricted  Carrying, Moving and Handling Objects Goal Status ():  At least 1 percent but less than 20 percent impaired, limited or restricted    Date of Patient follow up with Physician:  Preferred Language for Healthcare:   [x]English       []other:  Latex Allergy:  [x]NO      []YES  RESTRICTIONS/PRECAUTIONS:  Has  A fib but no pacemaker, progress to prom slowly as pat is fearful- 18      Progress Note: []  Yes  []  No  Next due by : Visit #10       Visit # Insurance Allowable Requires auth   9 medicare    no:[x]                  yes:[]    From 18 to 2018    Pain level: 2/10 much more painful with PROM      SUBJECTIVE:   Watched patient in the waiting area and she did not even initiate using the left hand to manipulate her newspaper or to give her bag to her . She reports her fingers are getting looser. Is painful to press index fingertip onto anything    8/20/18 Edema is less today than last visit. She still has significant stiffness in fingers, tumb and wrist. We will have her stop with the edema glove during the day, and really encourage use of hand for light activities at home. 8/15/18 Thumb and index are the most numb, middle is improving, ring and small are almost back to normal. MD gave her a comfort cool neoprene thumb orthosis yesterday. She did not take it off since and today she arrived at therapy with the fingers and thumb  very edematous distal to the brace. Will educate her on making sure the brace is not too tight around her wrist. She may benefit from the cmc brace more in the future when she has more functional use of hand and needs the thumb support. Did tell her to leave the brace off for now and really try and use her hand. She wanted to wear the exos brace and therapist discouraged her from doing so as her hand is so stiff and swollen that use of the brace is seeming to make this worse. 8/6/18 Thumb is not as painful today, tolerated taping for edema, finger wrapping worked well, hand still edematous. She is progressing very slowly, edema is persistent and pain tolerance limits how much she can tolerate in therapy. She remains optimistic and thinks going to her chiropractor will help with her carpal tunnel symptoms. 8/6/18 Offered to make a olesya on her forearm with a Sharpie pen so she will know the proper placement of the orthosis. She continues to put it on improperly in spite of being instructed each therapy visit. Thumb and fingers very edematous and part of that is from improper placement by patient of exos orthosis.  Therapist reviewed again, she is still wearing it too distal and it is pressing on the cmc area of her thumb, scapular, scapulothoracic and UE control with self care, reaching, carrying, lifting, house/yardwork, driving/computer work. folding towels today to encourage use of both hands together 9/5/18   Therapeutic Activities:    [] ( 17983) therapeutic activities, direct (one on one) patient contact. Use of dynamic activities to improve functional performance. Activities of Daily Living:  [] (09861) Provided self-care/home management training (i.e., activities of daily living and compensatory training, meal preparation, safety procedures, and instructions in use of assistive technology devices/adaptive equipment)     Home Exercise Program:  Added to HEP copy in chart 8/1/18   [] (12633) Reviewed/Progressed HEP activities related to strengthening, flexibility, endurance, ROM of scapular, scapulothoracic and UE control with self care, reaching, carrying, lifting, house/yardwork, driving/computer work    Manual Treatments: retro grade and gentle soft tissue mobs hand     [x] ([id number]) Provided manual therapy to mobilize soft tissue/joints of the UE for the purpose of modulating pain, promoting relaxation,  increasing ROM, reducing/eliminating soft tissue swelling/inflammation/restriction, improving soft tissue extensibility and allowing for proper ROM for normal function with self care, reaching, carrying, lifting, house/yardwork, driving/computer work    Splinting:  [] Fabrication of: L-code-finger flexion cuff, used stockinet and velcro to flex all fingers at the same time. [x] (69136) Checkout for orthotic/prosthetic use, established patient   [] (26728) Orthotic management and training (fitting and assessment)  [x] Comments: 9/5/18 made a p1 block orthosis to use at home to encourage IP flexion.      Charges:   45   Total Treatment Minutes: 60   [] EVAL (LOW) 50620   [] OT Re-eval (66343)  [] EVAL (MOD) 31832   [] EVAL (HIGH) 61112       [x] Jennifer (75684) x  1   [] CFMAY(73046)  [x] NMR (32493) x  1   [] Estim (attended) (44235)   [x] Manual ([id number]) x  1    [] US (15317)  [] TA (02329) x      [] Paraffin (83233)  [] ADL  (74767) x     [] Splint/L code:    [] Estim (unattended) (24881)  [] Other:  [x] Fluidotherapy (30533)  [](98307) Checkout for orthotic use, established patient x       [] (25688) Orthotic mgmt & training  x        GOALS:Short Term Goals: To be achieved in: 2 weeks  1. Independent in HEP and progression per patient tolerance, in order to prevent re-injury. 2. Patient will have a decrease in pain to facilitate improvement in movement, function, and ADLs as indicated by Functional Deficits.     Long Term Goals to be achieved in 12  weeks, including patient directed goals to address identified performance deficits:  1) Pt to be independent in graded HEP progression with a good level of effort and compliance. 2) Pt to report a score of 19 % or less on the Quick DASH disability questionnaire for increased performance with carrying, moving, and handling objects. 3) Pt will demonstrate increased ROM to FINGERTIPS touching palm, wrist 50/50 for improved ability to resume driving, lift a pot from the stove. 4) Pt will demonstrate increased strength to  50% of right for improved  and lift items such as a pan. 5) Pt will have a decrease in pain to 2/10 to facilitate improvement in ability to resume driving, lift a pot from the stove.     Progression Towards Functional goals:  [x] Patient is progressing as expected towards functional goals listed. [] Progression is slowed due to complexities listed. [] Progression has been slowed due to co-morbidities. [] Plan just implemented, too soon to assess goals progression  [] Other: slow progression-persistent edema, is painful    ASSESSMENT:  Pt reports her hand hurts and she avoids using it.      Treatment/Activity Tolerance:  [] Patient tolerated treatment well [] Patient limited by fatique  [x] Patient limited by pain and edema  [] Patient limited by other medical complications  [] Other:     Prognosis: [x] Good [] Fair  [] Poor    Patient Requires Follow-up: [x] Yes  [] No    PLAN: Recommend Occupational Therapy 2 times a week for 5 weeks  [x] Continue per plan of care [] Alter current plan (see comments)  [] Plan of care initiated [] Hold pending MD visit [] Discharge    Plan for next session: progress rom, pain control, encourage functional use of hand   Thermal modalities, functional activities and strengthening as inicated, AROM, PROM as indicated    Electronically signed by: Mamie Perez OTR/L, [address] [place], [place] [id number]

## 2018-09-07 ENCOUNTER — HOSPITAL ENCOUNTER (OUTPATIENT)
Dept: OCCUPATIONAL THERAPY | Age: 73
Setting detail: THERAPIES SERIES
End: 2018-09-07
Payer: MEDICARE

## 2018-09-10 ENCOUNTER — HOSPITAL ENCOUNTER (OUTPATIENT)
Dept: OCCUPATIONAL THERAPY | Age: 73
Setting detail: THERAPIES SERIES
Discharge: HOME | End: 2018-09-10
Payer: MEDICARE

## 2018-09-10 PROCEDURE — 97140 MANUAL THERAPY 1/> REGIONS: CPT | Performed by: OCCUPATIONAL THERAPIST

## 2018-09-10 PROCEDURE — 97022 WHIRLPOOL THERAPY: CPT | Performed by: OCCUPATIONAL THERAPIST

## 2018-09-10 PROCEDURE — G8985 CARRY GOAL STATUS: HCPCS | Performed by: OCCUPATIONAL THERAPIST

## 2018-09-10 PROCEDURE — 97110 THERAPEUTIC EXERCISES: CPT | Performed by: OCCUPATIONAL THERAPIST

## 2018-09-10 PROCEDURE — G8984 CARRY CURRENT STATUS: HCPCS | Performed by: OCCUPATIONAL THERAPIST

## 2018-09-10 NOTE — FLOWSHEET NOTE
Background/Relevant Medical & Therapy History: Patient fell walking through a restaurant at [facility] had immediate pain followed by swelling and bruising and weakness of the left hand and wrist.  reports that all fingers are numb. Skin macerated in thenar crease, patient did not have the orthosis in proper position when she came in, was too far distal and pushing on her 1st cmc which is very painful. She was able to put on properly with instructions prior to leaving today. painful to gently abduct thumb out of palm. Can barely flex her fingers or thumb  today.       OBJECTIVE:    Date:  Hand Dominance:     [x]  Right    [] Left 2018    8/6/18  8/20/18  8/27/18  9/5/18  9/10/18    Objective Measures:          PAIN 5/10  4/10 2/10 (greater with passive digit flex) 2/10  2/10   Quick DASH 48%        Digit  ROM         Index       MP:  PIP:  DIP: /45  /35  /05  45  55  05 50  55  05 50  55  0                              Long MP:  PIP:  DIP /55  /15  /+15  45  30  +15  55  50  0                              Ring MP:  PIP:  DIP /30  /35  /+15  30  50  +10    25  65  0                              Small MP:  PIP:  DIP /20  /50  /10  35  60  20  15  60  15   Digits tip to DPFC in cm Index:10  Lon  Ring:10  Small:9 9  10  8  7.5    7.5  8.5  7.5  6.0   Thumb ROM Severe limitation     MP: /20  IP: /25   Thumb opposition  unable index       Thumb Radial/Palmar abd ROM Limited likely due to long term cmc OA        Wrist ROM Ext/Flex R:65/75  L:35/30   45/35    55/45    60/65   15Rad/Uln dev ROM R:  L:        Forearm ROM  Sup/pron R:70/80  L:35/30     20/80    55/80   Elbow ROM Ext/flex R:  L:not assessed        Shoulder Flex  Shoulder Abd  Shoulder IR/ER       Not assessed   Edema in cm circumf. Index p1 R:6.2  L:7.1     6.0  6.3   Edema in cm circumf.   Wrist R:18.2  L:15.8     17.8  16.1    strength in lbs R:  L:deferred        Pinch Strengthin lbs: lat  R:  L:        Pinch Strength in lbs:  3 point R:  L:           MMT:                Modalities: Has a-fib but no pacemaker 8/8/18  8/15/18  8/20/18  8/27/18  8/31/18  9/5/18  9/10/18    Contrast bath 5 cycles                   fluidotherapy 15 at end  15 15.0   15' 15 15   paraffin  12 min(suggested to patient by MD)        Therapeutic Exercise & Activities:          Foam roll, prom arom digits, arom wrist Prom digits to tolerance (is painful), aarom wrist and forearm Much more painful today due to swelling Prom to tolerance digit flexion, wrist ext, finger flexion taping for 7 min Prom fingers, wrist, finger and thumb abd, very poor tolerance for manual stretch, she does try to allow stretching PROM fingers thumb and wrist 5' 15 min prom and strethch Towel stretch forearm,              Finger flex strap          Foam block     Cone press into putty 5'  Prom finger flex, wrist e/f, forearm sup   Bean grasp    bilat wrist ext stretch on large therapy ball      Fine motor manip Foam blocks bilateral  Foam block - to each finger Foam block  Foam block       pulleys For whole arom movement to help with edema         finisher  Pulleys and finisher for whole arm use (edema control) finisher Finisher 3# bilat slide diagonal      putty Roll Finger tip press for flexion, roll, pinch Putty roll, pinch, -yellow  Same  Putty roll, cone press    Edema control k tape dorsal hand for edema, 1/2 inch strips, 2 pieces, x-small edema glove issued k -tape as per last visit, edema glove and also made a chip bag to wear in palm under glove for edema, and to encourage her to flex fingers around.                 re-eval               Therapeutic Exercise and NMR EXR- cuing needed, limited actively by hand numbness 8/1/18   [x] (83761) Provided verbal/tactile cueing for activities related to strengthening, flexibility, endurance, ROM  for improvements in scapular, scapulothoracic and UE control with self care, reaching, carrying, lifting, house/yardwork, progression-persistent edema, is painful    ASSESSMENT:  2/5 goals met, wrist is moving very well, lack of digit flexion is main limitation with use of hand.      Treatment/Activity Tolerance:  [] Patient tolerated treatment well [] Patient limited by fatique  [x] Patient limited by pain and edema  [] Patient limited by other medical complications  [] Other:     Prognosis: [x] Good [] Fair  [] Poor    Patient Requires Follow-up: [x] Yes  [] No    PLAN: Recommend Occupational Therapy 2 times a week for 5 weeks  [x] Continue per plan of care [] Alter current plan (see comments)  [] Plan of care initiated [] Hold pending MD visit [] Discharge    Plan for next session: progress rom, pain control, encourage functional use of hand   Thermal modalities, functional activities and strengthening as inicated, AROM, PROM as indicated    Electronically signed by: Fernandez Gupta OTR/L, [address] [place] [address] [id number]

## 2018-09-14 ENCOUNTER — HOSPITAL ENCOUNTER (OUTPATIENT)
Dept: OCCUPATIONAL THERAPY | Age: 73
Setting detail: THERAPIES SERIES
Discharge: HOME | End: 2018-09-14
Payer: MEDICARE

## 2018-09-14 PROCEDURE — 97140 MANUAL THERAPY 1/> REGIONS: CPT | Performed by: OCCUPATIONAL THERAPIST

## 2018-09-14 PROCEDURE — 97110 THERAPEUTIC EXERCISES: CPT | Performed by: OCCUPATIONAL THERAPIST

## 2018-09-14 PROCEDURE — 97022 WHIRLPOOL THERAPY: CPT | Performed by: OCCUPATIONAL THERAPIST

## 2018-09-14 NOTE — FLOWSHEET NOTE
[facility], [address] [place]  Phone: [phone]  Fax [phone]  Hand Therapy Daily Treatment Note  Date:  2018    Patient: Kathryn Henderson   : 1945   MRN: [id number]  Referring Physician: Referring Practitioner: Merlin Bread, MD       Medical Diagnosis Information:   Diagnosis: S62.102D (ICD-10-CM) - Closed fracture of left wrist with routine healing, subsequent encounter   Treatment Diagnosis: M25.532 (ICD-10-CM) - Left wrist pain     Date of injury:6/15/18  Date of Surgery/Type of procedure: Insurance information:OT Insurance Information: Medicare,    Comorbidities Affecting Functional Performance:     []Anxiety (F41.9)/Depression (F32.9)   []Diabetes Type 1(E10.65) or 2 (E11.65)   []Rheumatoid Arthritis (M05.9)  []Fibromyalgia (M79.7)  []Neuropathy(G60.9)  [x]Osteoarthritis(M19.91)  []None   []Other:    G-code: OT G-codes  Functional Assessment Tool Used: QDASH APPLIED ON 18  Score: 48%  Functional Limitation: Carrying, moving and handling objects  Carrying, Moving and Handling Objects Current Status (): At least 40 percent but less than 60 percent impaired, limited or restricted  Carrying, Moving and Handling Objects Goal Status ():  At least 1 percent but less than 20 percent impaired, limited or restricted    Date of Patient follow up with Physician:  Preferred Language for Healthcare:   [x]English       []other:  Latex Allergy:  [x]NO      []YES  RESTRICTIONS/PRECAUTIONS:  Has  A fib but no pacemaker, progress to prom slowly as pat is fearful- 18      Progress Note: []  Yes  []  No  Next due by : Visit #10       Visit # Insurance Allowable Requires auth   11 medicare    no:[x]                  yes:[]    From 18 to 2018    Pain level: 2/10 much more painful with PROM      SUBJECTIVE:   Patient was observed to be reading a book and not using the left hand to hold the book in a normal manner. She hand the book resting on her wrist. She needs cuing to use the left in in typical bilateral activities. She reports she may be have gotten out of the habit of using the hand since she is afraid to feel the tingling pain in her fingertips with pressing down on them. She does agree to try and use hand to do dishes and fold clothing before she returns for next visit. 9/10/18 Her hand has much less edema noted today, can see skin creases. She is still having pain in fingertips with pressing through them. Reports she cant lift the end of a mattress to tuck in sheets. 8/20/18 Edema is less today than last visit. She still has significant stiffness in fingers, tumb and wrist. We will have her stop with the edema glove during the day, and really encourage use of hand for light activities at home. 8/15/18 Thumb and index are the most numb, middle is improving, ring and small are almost back to normal. MD gave her a comfort cool neoprene thumb orthosis yesterday. She did not take it off since and today she arrived at therapy with the fingers and thumb  very edematous distal to the brace. Will educate her on making sure the brace is not too tight around her wrist. She may benefit from the cmc brace more in the future when she has more functional use of hand and needs the thumb support. Did tell her to leave the brace off for now and really try and use her hand. She wanted to wear the exos brace and therapist discouraged her from doing so as her hand is so stiff and swollen that use of the brace is seeming to make this worse. 8/6/18 Thumb is not as painful today, tolerated taping for edema, finger wrapping worked well, hand still edematous. She is progressing very slowly, edema is persistent and pain tolerance limits how much she can tolerate in therapy.  She remains optimistic and thinks going to her chiropractor will help with her carpal tunnel symptoms. 18 Offered to make a olesya on her forearm with a Sharpie pen so she will know the proper placement of the orthosis. She continues to put it on improperly in spite of being instructed each therapy visit. Thumb and fingers very edematous and part of that is from improper placement by patient of exos orthosis. Therapist reviewed again, she is still wearing it too distal and it is pressing on the cmc area of her thumb, which is already arthritic and painful. Background/Relevant Medical & Therapy History: Patient fell walking through a restaurant at [facility] had immediate pain followed by swelling and bruising and weakness of the left hand and wrist.  reports that all fingers are numb. Skin macerated in thenar crease, patient did not have the orthosis in proper position when she came in, was too far distal and pushing on her 1st cmc which is very painful. She was able to put on properly with instructions prior to leaving today. painful to gently abduct thumb out of palm.  Can barely flex her fingers or thumb  today.       OBJECTIVE:    Date:  Hand Dominance:     [x]  Right    [] Left 2018    8/6/18  8/20/18  8/27/18  9/5/18  9/10/18  9/14/18     Objective Measures:           PAIN 10  10 2/10 (greater with passive digit flex) 2/10  2/10    Quick DASH 48%         Digit  ROM         Index       MP:  PIP:  DIP: /45  /35  /05  45  55  05 50  55  05 50  55  0                               Long MP:  PIP:  DIP /55  /15  /+15  45  30  +15  55  50  0                               Ring MP:  PIP:  DIP /30  /35  /+15  30  50  +10    25  65  0                               Small MP:  PIP:  DIP /20  /50  /10  35  60  20  15  60  15    Digits tip to DPFC in cm Index:10  Lon  Ring:10  Small:9 9  10  8  7.5    active  7.5  8.5  7.5  6.0 Passive  4  5  5  5   Thumb ROM Severe limitation     MP: /20  IP: /25    Thumb opposition  unable index        Thumb Radial/Palmar abd ROM Limited

## 2018-09-17 ENCOUNTER — HOSPITAL ENCOUNTER (OUTPATIENT)
Dept: OCCUPATIONAL THERAPY | Age: 73
Setting detail: THERAPIES SERIES
Discharge: HOME | End: 2018-09-17
Payer: MEDICARE

## 2018-09-17 PROCEDURE — 97022 WHIRLPOOL THERAPY: CPT | Performed by: OCCUPATIONAL THERAPIST

## 2018-09-17 PROCEDURE — 97140 MANUAL THERAPY 1/> REGIONS: CPT | Performed by: OCCUPATIONAL THERAPIST

## 2018-09-17 PROCEDURE — 97110 THERAPEUTIC EXERCISES: CPT | Performed by: OCCUPATIONAL THERAPIST

## 2018-09-17 NOTE — FLOWSHEET NOTE
left hand to carry her book, she put the extra effort in to use her hand more normally. She reports frustration with how long it is taking for her fingers to bend. She have good wrist and forearm ROM, the lack of ability to flex her fingers is a great limitation to functional use of her hand. Background/Relevant Medical & Therapy History: Patient fell walking through a restaurant at [facility] had immediate pain followed by swelling and bruising and weakness of the left hand and wrist.  reports that all fingers are numb. Skin macerated in thenar crease, patient did not have the orthosis in proper position when she came in, was too far distal and pushing on her 1st cmc which is very painful. She was able to put on properly with instructions prior to leaving today. painful to gently abduct thumb out of palm.  Can barely flex her fingers or thumb  today.       OBJECTIVE:    Date:  Hand Dominance:     [x]  Right    [] Left 2018    8/6/18  8/20/18  8/27/18  9/5/18  9/10/18  9/14/18     Objective Measures:           PAIN 5/10  4/10 2/10 (greater with passive digit flex) 2/10  2/10    Quick DASH 48%         Digit  ROM         Index       MP:  PIP:  DIP: /45  /35  /05  45  55  05 50  55  05 50  55  0                               Long MP:  PIP:  DIP /55  /15  /+15  45  30  +15  55  50  0                               Ring MP:  PIP:  DIP /30  /35  /+15  30  50  +10    25  65  0                               Small MP:  PIP:  DIP /20  /50  /10  35  60  20  15  60  15    Digits tip to DPFC in cm Index:10  Lon  Ring:10  Small:9 9  10  8  7.5    active  7.5  8.5  7.5  6.0 Passive  4  5  5  5   Thumb ROM Severe limitation     MP: /20  IP: /25    Thumb opposition  unable index        Thumb Radial/Palmar abd ROM Limited likely due to long term cmc OA         Wrist ROM Ext/Flex R:65/75  L:35/30   45/35    55/45    60/65    15Rad/Uln dev ROM R:  L:         Forearm ROM  Sup/pron R:70/80  L:35/30     20/80 Putty and mallet, , roll   Edema control k tape dorsal hand for edema, 1/2 inch strips, 2 pieces, x-small edema glove issued k -tape as per last visit, edema glove and also made a chip bag to wear in palm under glove for edema, and to encourage her to flex fingers around. re-eval                   Therapeutic Exercise and NMR EXR- cuing needed, limited actively by hand numbness 8/1/18   [x] (47454) Provided verbal/tactile cueing for activities related to strengthening, flexibility, endurance, ROM  for improvements in scapular, scapulothoracic and UE control with self care, reaching, carrying, lifting, house/yardwork, driving/computer work.    [] (51757) Provided verbal/tactile cueing for activities related to improving balance, coordination, kinesthetic sense, posture, motor skill, proprioception  to assist with  scapular, scapulothoracic and UE control with self care, reaching, carrying, lifting, house/yardwork, driving/computer work. folding towels today to encourage use of both hands together 9/5/18   Therapeutic Activities:    [] ( 71192) therapeutic activities, direct (one on one) patient contact. Use of dynamic activities to improve functional performance. Activities of Daily Living:  [] (15589) Provided self-care/home management training (i.e., activities of daily living and compensatory training, meal preparation, safety procedures, and instructions in use of assistive technology devices/adaptive equipment)     Home Exercise Program:  Added towel stretch for supination-9/10/18   [x] (80737) Reviewed/Progressed HEP activities related to strengthening, flexibility, endurance, ROM of scapular, scapulothoracic and UE control with self care, reaching, carrying, lifting, house/yardwork, driving/computer work    Manual Treatments: retro grade and gentle soft tissue mobs hand, focused on trying to get movement in first web, did not get much improvement.  9/14/18      [x] ([id number]) Provided manual therapy to mobilize soft tissue/joints of the UE for the purpose of modulating pain, promoting relaxation,  increasing ROM, reducing/eliminating soft tissue swelling/inflammation/restriction, improving soft tissue extensibility and allowing for proper ROM for normal function with self care, reaching, carrying, lifting, house/yardwork, driving/computer work    Splinting:  [] Fabrication of: L-code-finger flexion cuff, used stockinet and velcro to flex all fingers at the same time.   [] (70257) Checkout for orthotic/prosthetic use, established patient   [] (13123) Orthotic management and training (fitting and assessment)  [] Comments: 9/5/18 made a p1 block orthosis to use at home to encourage IP flexion. Charges:   45   Total Treatment Minutes: 60   [] EVAL (LOW) 57102   [] OT Re-eval (20790)  [] EVAL (MOD) 54266   [] EVAL (HIGH) 50235       [x] Jennifer (21990) x  2   [] RZFBV(41480)  [] NMR (06153) x  1   [] Estim (attended) (88769)   [x] Manual (90329) x  1    [] US (11823)  [] TA (69867) x      [] Paraffin (02587)  [] ADL  (64945) x     [] Splint/L code:    [] Estim (unattended) (29489)  [] Other:  [x] Fluidotherapy (28827)  [](28204) Checkout for orthotic use, established patient x       [] (95791) Orthotic mgmt & training  x        GOALS:Short Term Goals: To be achieved in: 2 weeks  1. Independent in HEP and progression per patient tolerance, in order to prevent re-injury.    2. Patient will have a decrease in pain to facilitate improvement in movement, function, and ADLs as indicated by Functional Deficits.     Long Term Goals to be achieved in 12  weeks, including patient directed goals to address identified performance deficits:  1) Pt to be independent in graded HEP progression with a good level of effort and compliance.-ongoing  2) Pt to report a score of 19 % or less on the Quick DASH disability questionnaire for increased performance with carrying, moving, and handling objects.-met  3) Pt will demonstrate increased ROM to FINGERTIPS touching palm, wrist 50/50 for improved ability to resume driving, lift a pot from the stove.-not met  4) Pt will demonstrate increased strength to  50% of right for improved  and lift items such as a pan. -not met-unable to flex enough to check   5) Pt will have a decrease in pain to 2/10 to facilitate improvement in ability to resume driving, lift a pot from the stove.-met     Progression Towards Functional goals:  [x] Patient is progressing as expected towards functional goals listed. [] Progression is slowed due to complexities listed. [] Progression has been slowed due to co-morbidities. [] Plan just implemented, too soon to assess goals progression  [] Other: slow progression-persistent edema, is painful    ASSESSMENT:  , wrist is moving very well, forearm rom is improving,  lack of digit flexion is still main limitation with use of hand.      Treatment/Activity Tolerance:  [] Patient tolerated treatment well [] Patient limited by fatique  [x] Patient limited by pain and edema  [] Patient limited by other medical complications  [] Other:     Prognosis: [x] Good [] Fair  [] Poor    Patient Requires Follow-up: [x] Yes  [] No    PLAN: Recommend Occupational Therapy 2 times a week for 5 weeks  [x] Continue per plan of care [] Alter current plan (see comments)  [] Plan of care initiated [] Hold pending MD visit [] Discharge    Plan for next session: progress rom, pain control, encourage functional use of hand   Thermal modalities, functional activities and strengthening as inicated, AROM, PROM as indicated    Electronically signed by: Jabari Britton OTR/L, [address] [place] [address] [id number]

## 2018-09-19 ENCOUNTER — HOSPITAL ENCOUNTER (OUTPATIENT)
Dept: OCCUPATIONAL THERAPY | Age: 73
Setting detail: THERAPIES SERIES
Discharge: HOME | End: 2018-09-19
Payer: MEDICARE

## 2018-09-19 PROCEDURE — 97110 THERAPEUTIC EXERCISES: CPT | Performed by: OCCUPATIONAL THERAPIST

## 2018-09-19 PROCEDURE — 97022 WHIRLPOOL THERAPY: CPT | Performed by: OCCUPATIONAL THERAPIST

## 2018-09-19 PROCEDURE — 97140 MANUAL THERAPY 1/> REGIONS: CPT | Performed by: OCCUPATIONAL THERAPIST

## 2018-09-19 NOTE — FLOWSHEET NOTE
[facility], [address] [place] [address]  Phone: [phone]  Fax [phone]  Hand Therapy Daily Treatment Note  Date:  2018    Patient: Perfecto Caraballo   : 1945   MRN: [id number]  Referring Physician: Referring Practitioner: Scarlett Givens MD       Medical Diagnosis Information:   Diagnosis: S62.102D (ICD-10-CM) - Closed fracture of left wrist with routine healing, subsequent encounter   Treatment Diagnosis: M25.532 (ICD-10-CM) - Left wrist pain     Date of injury:6/15/18  Date of Surgery/Type of procedure: Insurance information:OT Insurance Information: Medicare, [employer]   Comorbidities Affecting Functional Performance:     []Anxiety (F41.9)/Depression (F32.9)   []Diabetes Type 1(E10.65) or 2 (E11.65)   []Rheumatoid Arthritis (M05.9)  []Fibromyalgia (M79.7)  []Neuropathy(G60.9)  [x]Osteoarthritis(M19.91)  []None   []Other:    G-code: OT G-codes  Functional Assessment Tool Used: QDASH APPLIED ON 18  Score: 48%  Functional Limitation: Carrying, moving and handling objects  Carrying, Moving and Handling Objects Current Status (): At least 40 percent but less than 60 percent impaired, limited or restricted  Carrying, Moving and Handling Objects Goal Status ():  At least 1 percent but less than 20 percent impaired, limited or restricted    Date of Patient follow up with Physician:  Preferred Language for Healthcare:   [x]English       []other:  Latex Allergy:  [x]NO      []YES  RESTRICTIONS/PRECAUTIONS:  Has  A fib but no pacemaker, progress to prom slowly as pat is fearful- 18      Progress Note: []  Yes  [x]  No  Next due by : Visit #10       Visit # Insurance Allowable Requires auth   13 medicare    no:[x]                  yes:[]    From 18 to 2018    Pain level: 2/10    SUBJECTIVE:   Still very limited in functional use of her left hand Background/Relevant Medical & Therapy History: Patient fell walking through a restaurant at [facility] had immediate pain followed by swelling and bruising and weakness of the left hand and wrist.  reports that all fingers are numb. Skin macerated in thenar crease, patient did not have the orthosis in proper position when she came in, was too far distal and pushing on her 1st cmc which is very painful. She was able to put on properly with instructions prior to leaving today. painful to gently abduct thumb out of palm. Can barely flex her fingers or thumb  today.       OBJECTIVE:    Date:  Hand Dominance:     [x]  Right    [] Left 2018    8/6/18  8/20/18  8/27/18  9/5/18  9/10/18  9/14/18   9/19/18    Objective Measures:            PAIN 5/10  4/10 2/10 (greater with passive digit flex) 2/10  2/10     Quick DASH 48%     14%     Digit  ROM         Index       MP:  PIP:  DIP: /45  /35  /05  45  55  05 50  55  05 50  55  0  60  60  05                              Long MP:  PIP:  DIP /55  /15  /+15  45  30  +15  55  50  0  60  55  +15                              Ring MP:  PIP:  DIP /30  /35  /+15  30  50  +10    25  65  0  35  70  +08                              Small MP:  PIP:  DIP /20  /50  /10  35  60  20  15  60  15  10  65  20   Digits tip to DPFC in cm Index:10  Lon  Ring:10  Small:9 9  10  8  7.5    active  7.5  8.5  7.5  6.0 Passive  4  5  5  5    Thumb ROM Severe limitation     MP: /20  IP: /25     Thumb opposition  unable index         Thumb Radial/Palmar abd ROM Limited likely due to long term cmc OA          Wrist ROM Ext/Flex R:65/75  L:35/30   45/35    55/45    60/65  60/60   15Rad/Uln dev ROM R:  L:          Forearm ROM  Sup/pron R:70/80  L:35/30     20/80    55/80    55/80   Elbow ROM Ext/flex R:  L:not assessed          Shoulder Flex  Shoulder Abd  Shoulder IR/ER       Not assessed     Edema in cm circumf. Index p1 R:6.2  L:7.1     6.0  6.3     Edema in cm circumf.   Wrist mobilize soft tissue/joints of the UE for the purpose of modulating pain, promoting relaxation,  increasing ROM, reducing/eliminating soft tissue swelling/inflammation/restriction, improving soft tissue extensibility and allowing for proper ROM for normal function with self care, reaching, carrying, lifting, house/yardwork, driving/computer work    Splinting:  [] Fabrication of: L-code-finger flexion cuff, used stockinet and velcro to flex all fingers at the same time.   [] (58026) Checkout for orthotic/prosthetic use, established patient   [] (75496) Orthotic management and training (fitting and assessment)  [] Comments: 9/5/18 made a p1 block orthosis to use at home to encourage IP flexion. Charges:   40   Total Treatment Minutes: 55   [] EVAL (LOW) 91589   [] OT Re-eval (38916)  [] EVAL (MOD) 13230   [] EVAL (HIGH) 44554       [x] Jennifer (83843) x  2   [] WBRIL(85074)  [] NMR (20678) x  1   [] Estim (attended) (64042)   [x] Manual (69064) x  1    [] US (26414)  [] TA (96271) x      [] Paraffin (29371)  [] ADL  (98648) x     [] Splint/L code:    [] Estim (unattended) (69212)  [] Other:  [x] Fluidotherapy (46114)  [](97543) Checkout for orthotic use, established patient x       [] (56517) Orthotic mgmt & training  x        GOALS:Short Term Goals: To be achieved in: 2 weeks  1. Independent in HEP and progression per patient tolerance, in order to prevent re-injury.    2. Patient will have a decrease in pain to facilitate improvement in movement, function, and ADLs as indicated by Functional Deficits.     Long Term Goals to be achieved in 12  weeks, including patient directed goals to address identified performance deficits:  1) Pt to be independent in graded HEP progression with a good level of effort and compliance.-ongoing  2) Pt to report a score of 19 % or less on the Quick DASH disability questionnaire for increased performance with carrying, moving, and handling objects.-met  3) Pt will demonstrate increased

## 2018-09-21 ENCOUNTER — APPOINTMENT (OUTPATIENT)
Dept: OCCUPATIONAL THERAPY | Age: 73
End: 2018-09-21
Payer: MEDICARE

## 2018-09-25 ENCOUNTER — OFFICE VISIT (OUTPATIENT)
Dept: ORTHOPEDIC SURGERY | Age: 73
End: 2018-09-25
Payer: MEDICARE

## 2018-09-25 VITALS
HEART RATE: 82 BPM | WEIGHT: 230 LBS | HEIGHT: 68 IN | SYSTOLIC BLOOD PRESSURE: 121 MMHG | DIASTOLIC BLOOD PRESSURE: 63 MMHG | BODY MASS INDEX: 34.86 KG/M2

## 2018-09-25 DIAGNOSIS — M25.532 LEFT WRIST PAIN: Primary | ICD-10-CM

## 2018-09-25 DIAGNOSIS — R20.0 NUMBNESS OF LEFT HAND: ICD-10-CM

## 2018-09-25 DIAGNOSIS — S62.102D CLOSED FRACTURE OF LEFT WRIST WITH ROUTINE HEALING, SUBSEQUENT ENCOUNTER: ICD-10-CM

## 2018-09-25 PROCEDURE — 4040F PNEUMOC VAC/ADMIN/RCVD: CPT | Performed by: ORTHOPAEDIC SURGERY

## 2018-09-25 PROCEDURE — 1101F PT FALLS ASSESS-DOCD LE1/YR: CPT | Performed by: ORTHOPAEDIC SURGERY

## 2018-09-25 PROCEDURE — 1090F PRES/ABSN URINE INCON ASSESS: CPT | Performed by: ORTHOPAEDIC SURGERY

## 2018-09-25 PROCEDURE — 1036F TOBACCO NON-USER: CPT | Performed by: ORTHOPAEDIC SURGERY

## 2018-09-25 PROCEDURE — G8400 PT W/DXA NO RESULTS DOC: HCPCS | Performed by: ORTHOPAEDIC SURGERY

## 2018-09-25 PROCEDURE — 99213 OFFICE O/P EST LOW 20 MIN: CPT | Performed by: ORTHOPAEDIC SURGERY

## 2018-09-25 PROCEDURE — 3017F COLORECTAL CA SCREEN DOC REV: CPT | Performed by: ORTHOPAEDIC SURGERY

## 2018-09-25 PROCEDURE — G8417 CALC BMI ABV UP PARAM F/U: HCPCS | Performed by: ORTHOPAEDIC SURGERY

## 2018-09-25 PROCEDURE — 1123F ACP DISCUSS/DSCN MKR DOCD: CPT | Performed by: ORTHOPAEDIC SURGERY

## 2018-09-25 PROCEDURE — G8427 DOCREV CUR MEDS BY ELIG CLIN: HCPCS | Performed by: ORTHOPAEDIC SURGERY

## 2018-09-25 RX ORDER — PREDNISONE 20 MG/1
TABLET ORAL
COMMUNITY
Start: 2018-09-06

## 2018-09-25 NOTE — PROGRESS NOTES
fracture, left hand numbness   We will continue with our current care plan. Patient is not interested in further workup or potential treatment for likely posttraumatic left carpal tunnel syndrome. She feels that the numbness is slowly improving, therefore we will continue to monitor. Edema control and elevation may help this. I encouraged once again hand therapy to help regain function, use and motion. She will give this some thought. Otherwise be highly encouraged home he treatments along with range of motion, soft tissue massage and progressive stretching of the hand. She is likely to have some component of long-term wrist and/or finger stiffness, this was discussed once again today. She will follow up in 3 months unless needed sooner. Repeat x-ray left wrist.    All questions and concerns were addressed today. Patient is in agreement with the plan. Nikolas Nolan MD  Hand & Upper Extremity Surgery  [address] [facility]  A partner of [facility] ([facility])        Please note that this transcription was created using voice recognition software. Any errors are unintentional and may be due to voice recognition transcription.

## 2018-09-27 PROCEDURE — G8985 CARRY GOAL STATUS: HCPCS | Performed by: OCCUPATIONAL THERAPIST

## 2018-09-27 PROCEDURE — G8984 CARRY CURRENT STATUS: HCPCS | Performed by: OCCUPATIONAL THERAPIST

## 2018-09-27 PROCEDURE — G8986 CARRY D/C STATUS: HCPCS | Performed by: OCCUPATIONAL THERAPIST

## 2018-12-18 ENCOUNTER — OFFICE VISIT (OUTPATIENT)
Dept: ORTHOPEDIC SURGERY | Age: 73
End: 2018-12-18
Payer: MEDICARE

## 2018-12-18 VITALS — WEIGHT: 229.94 LBS | BODY MASS INDEX: 34.85 KG/M2 | HEIGHT: 68 IN

## 2018-12-18 DIAGNOSIS — M25.642 JOINT STIFFNESS OF HAND, LEFT: ICD-10-CM

## 2018-12-18 DIAGNOSIS — S62.102D CLOSED FRACTURE OF LEFT WRIST WITH ROUTINE HEALING, SUBSEQUENT ENCOUNTER: ICD-10-CM

## 2018-12-18 DIAGNOSIS — M25.572 LEFT ANKLE PAIN, UNSPECIFIED CHRONICITY: ICD-10-CM

## 2018-12-18 DIAGNOSIS — M25.532 LEFT WRIST PAIN: Primary | ICD-10-CM

## 2018-12-18 PROCEDURE — 4040F PNEUMOC VAC/ADMIN/RCVD: CPT | Performed by: ORTHOPAEDIC SURGERY

## 2018-12-18 PROCEDURE — G8427 DOCREV CUR MEDS BY ELIG CLIN: HCPCS | Performed by: ORTHOPAEDIC SURGERY

## 2018-12-18 PROCEDURE — 3017F COLORECTAL CA SCREEN DOC REV: CPT | Performed by: ORTHOPAEDIC SURGERY

## 2018-12-18 PROCEDURE — G8484 FLU IMMUNIZE NO ADMIN: HCPCS | Performed by: ORTHOPAEDIC SURGERY

## 2018-12-18 PROCEDURE — 1090F PRES/ABSN URINE INCON ASSESS: CPT | Performed by: ORTHOPAEDIC SURGERY

## 2018-12-18 PROCEDURE — 1123F ACP DISCUSS/DSCN MKR DOCD: CPT | Performed by: ORTHOPAEDIC SURGERY

## 2018-12-18 PROCEDURE — 99213 OFFICE O/P EST LOW 20 MIN: CPT | Performed by: ORTHOPAEDIC SURGERY

## 2018-12-18 PROCEDURE — G8417 CALC BMI ABV UP PARAM F/U: HCPCS | Performed by: ORTHOPAEDIC SURGERY

## 2018-12-18 PROCEDURE — 1101F PT FALLS ASSESS-DOCD LE1/YR: CPT | Performed by: ORTHOPAEDIC SURGERY

## 2018-12-18 PROCEDURE — 1036F TOBACCO NON-USER: CPT | Performed by: ORTHOPAEDIC SURGERY

## 2018-12-18 PROCEDURE — G8400 PT W/DXA NO RESULTS DOC: HCPCS | Performed by: ORTHOPAEDIC SURGERY

## 2019-01-15 ENCOUNTER — HOSPITAL ENCOUNTER (OUTPATIENT)
Dept: WOMENS IMAGING | Age: 74
Discharge: HOME | End: 2019-01-15
Payer: MEDICARE

## 2019-01-15 DIAGNOSIS — Z12.31 VISIT FOR SCREENING MAMMOGRAM: ICD-10-CM

## 2019-01-15 PROCEDURE — 77063 BREAST TOMOSYNTHESIS BI: CPT

## 2019-01-18 ENCOUNTER — OFFICE VISIT (OUTPATIENT)
Dept: ORTHOPEDIC SURGERY | Age: 74
End: 2019-01-18
Payer: MEDICARE

## 2019-01-18 VITALS — HEIGHT: 68 IN | BODY MASS INDEX: 34.85 KG/M2 | WEIGHT: 229.94 LBS

## 2019-01-18 DIAGNOSIS — M25.572 LEFT ANKLE PAIN, UNSPECIFIED CHRONICITY: Primary | ICD-10-CM

## 2019-01-18 DIAGNOSIS — M25.562 ACUTE PAIN OF LEFT KNEE: ICD-10-CM

## 2019-01-18 PROCEDURE — 1036F TOBACCO NON-USER: CPT | Performed by: ORTHOPAEDIC SURGERY

## 2019-01-18 PROCEDURE — 99213 OFFICE O/P EST LOW 20 MIN: CPT | Performed by: ORTHOPAEDIC SURGERY

## 2019-01-18 PROCEDURE — 1090F PRES/ABSN URINE INCON ASSESS: CPT | Performed by: ORTHOPAEDIC SURGERY

## 2019-01-18 PROCEDURE — 20610 DRAIN/INJ JOINT/BURSA W/O US: CPT | Performed by: ORTHOPAEDIC SURGERY

## 2019-01-18 PROCEDURE — 1123F ACP DISCUSS/DSCN MKR DOCD: CPT | Performed by: ORTHOPAEDIC SURGERY

## 2019-01-18 PROCEDURE — G8417 CALC BMI ABV UP PARAM F/U: HCPCS | Performed by: ORTHOPAEDIC SURGERY

## 2019-01-18 PROCEDURE — G8427 DOCREV CUR MEDS BY ELIG CLIN: HCPCS | Performed by: ORTHOPAEDIC SURGERY

## 2019-01-18 PROCEDURE — 1101F PT FALLS ASSESS-DOCD LE1/YR: CPT | Performed by: ORTHOPAEDIC SURGERY

## 2019-01-18 PROCEDURE — 4040F PNEUMOC VAC/ADMIN/RCVD: CPT | Performed by: ORTHOPAEDIC SURGERY

## 2019-01-18 PROCEDURE — G8484 FLU IMMUNIZE NO ADMIN: HCPCS | Performed by: ORTHOPAEDIC SURGERY

## 2019-01-18 PROCEDURE — G8400 PT W/DXA NO RESULTS DOC: HCPCS | Performed by: ORTHOPAEDIC SURGERY

## 2019-01-18 PROCEDURE — 3017F COLORECTAL CA SCREEN DOC REV: CPT | Performed by: ORTHOPAEDIC SURGERY

## 2019-06-18 ENCOUNTER — OFFICE VISIT (OUTPATIENT)
Dept: ORTHOPEDIC SURGERY | Age: 74
End: 2019-06-18
Payer: MEDICARE

## 2019-06-18 VITALS — WEIGHT: 229.94 LBS | HEIGHT: 68 IN | BODY MASS INDEX: 34.85 KG/M2

## 2019-06-18 DIAGNOSIS — S62.102D CLOSED FRACTURE OF LEFT WRIST WITH ROUTINE HEALING, SUBSEQUENT ENCOUNTER: Primary | ICD-10-CM

## 2019-06-18 PROCEDURE — G8427 DOCREV CUR MEDS BY ELIG CLIN: HCPCS | Performed by: ORTHOPAEDIC SURGERY

## 2019-06-18 PROCEDURE — G8417 CALC BMI ABV UP PARAM F/U: HCPCS | Performed by: ORTHOPAEDIC SURGERY

## 2019-06-18 PROCEDURE — 1036F TOBACCO NON-USER: CPT | Performed by: ORTHOPAEDIC SURGERY

## 2019-06-18 PROCEDURE — G8400 PT W/DXA NO RESULTS DOC: HCPCS | Performed by: ORTHOPAEDIC SURGERY

## 2019-06-18 PROCEDURE — 99212 OFFICE O/P EST SF 10 MIN: CPT | Performed by: ORTHOPAEDIC SURGERY

## 2019-06-18 PROCEDURE — 1090F PRES/ABSN URINE INCON ASSESS: CPT | Performed by: ORTHOPAEDIC SURGERY

## 2019-06-18 PROCEDURE — 3017F COLORECTAL CA SCREEN DOC REV: CPT | Performed by: ORTHOPAEDIC SURGERY

## 2019-06-18 PROCEDURE — 4040F PNEUMOC VAC/ADMIN/RCVD: CPT | Performed by: ORTHOPAEDIC SURGERY

## 2019-06-18 PROCEDURE — 1123F ACP DISCUSS/DSCN MKR DOCD: CPT | Performed by: ORTHOPAEDIC SURGERY

## 2019-06-18 NOTE — PROGRESS NOTES
Chief Complaint   Patient presents with    Wrist Pain     ck lt wrist, having stiffness       HISTORY OF PRESENT ILLNESS:  Chacho Timmons is a 75 y.o.  patient here for repeat evaluation after sustaining a left distal radius fracture approximately 1 year ago. Treated nonoperatively. She dealt with some carpal tunnel like symptoms post injury and also had quite a bit of hand stiffness. Today, she states that the numbness is all gone and the pain is all gone. She is getting back to her normal activities. She denies wrist stiffness but has hand stiffness. ROS:  ROS neg     Past medical history is reviewed again today, no changes to report    PHYSICAL EXAMINATION:  Patient is alert and pleasant, in no acute distress. The affected extremity is examined today. Left wrist reveals good alignment clinically with resolution of swelling. Wrist motion is excellent without crepitance or pain. Swelling of the hand is gone away. No clawing. Fingers are warm and sensate. Finger flexion is still 1 to 2 cm shy of the palm, she states that she is accustomed to this and does not mind    X-rays: None today, prior reviewed    IMPRESSION AND PLAN: Left distal radius fracture. Left hand stiffness    Doing well. We will continue with our current care plan. Activities as tolerated. She will continue on range of motion and stretching with gripping activities for the left hand. We are hopeful that she will still regain more motion of the digits going forward. She feels that she is doing quite well and does not want any more formal therapy. She states that she will come back as symptoms tolerate or dictate. All questions and concerns were addressed today. Patient is in agreement with the plan.         Juliette Logan MD  Hand & Upper Extremity Surgery  [address]  A partner of [facility] ([facility])        Please note that this transcription was created using voice recognition software. Any errors are unintentional and may be due to voice recognition transcription.

## 2019-10-31 ENCOUNTER — APPOINTMENT (OUTPATIENT)
Dept: CT IMAGING | Age: 74
End: 2019-10-31
Payer: MEDICARE

## 2019-10-31 ENCOUNTER — HOSPITAL ENCOUNTER (EMERGENCY)
Age: 74
Discharge: HOME | End: 2019-10-31
Attending: EMERGENCY MEDICINE
Payer: MEDICARE

## 2019-10-31 VITALS
TEMPERATURE: 97.5 F | BODY MASS INDEX: 34.86 KG/M2 | SYSTOLIC BLOOD PRESSURE: 131 MMHG | DIASTOLIC BLOOD PRESSURE: 77 MMHG | RESPIRATION RATE: 18 BRPM | OXYGEN SATURATION: 98 % | WEIGHT: 230 LBS | HEIGHT: 68 IN | HEART RATE: 72 BPM

## 2019-10-31 DIAGNOSIS — R10.9 ABDOMINAL PAIN, UNSPECIFIED ABDOMINAL LOCATION: ICD-10-CM

## 2019-10-31 DIAGNOSIS — K59.00 CONSTIPATION, UNSPECIFIED CONSTIPATION TYPE: Primary | ICD-10-CM

## 2019-10-31 LAB
A/G RATIO: 1.1 (ref 1.1–2.2)
ALBUMIN SERPL-MCNC: 3.8 G/DL (ref 3.4–5)
ALP BLD-CCNC: 97 U/L (ref 40–129)
ALT SERPL-CCNC: 23 U/L (ref 10–40)
ANION GAP SERPL CALCULATED.3IONS-SCNC: 14 MMOL/L (ref 3–16)
AST SERPL-CCNC: 39 U/L (ref 15–37)
BASOPHILS ABSOLUTE: 0 K/UL (ref 0–0.2)
BASOPHILS RELATIVE PERCENT: 0.7 %
BILIRUB SERPL-MCNC: 0.4 MG/DL (ref 0–1)
BILIRUBIN URINE: NEGATIVE
BLOOD, URINE: NEGATIVE
BUN BLDV-MCNC: 16 MG/DL (ref 7–20)
CALCIUM SERPL-MCNC: 9.2 MG/DL (ref 8.3–10.6)
CHLORIDE BLD-SCNC: 94 MMOL/L (ref 99–110)
CLARITY: CLEAR
CO2: 27 MMOL/L (ref 21–32)
COLOR: YELLOW
CREAT SERPL-MCNC: 0.8 MG/DL (ref 0.6–1.2)
EOSINOPHILS ABSOLUTE: 0.1 K/UL (ref 0–0.6)
EOSINOPHILS RELATIVE PERCENT: 1.2 %
EPITHELIAL CELLS, UA: NORMAL /HPF
GFR AFRICAN AMERICAN: >60
GFR NON-AFRICAN AMERICAN: >60
GLOBULIN: 3.6 G/DL
GLUCOSE BLD-MCNC: 106 MG/DL (ref 70–99)
GLUCOSE URINE: NEGATIVE MG/DL
HCT VFR BLD CALC: 37.9 % (ref 36–48)
HEMOGLOBIN: 13 G/DL (ref 12–16)
KETONES, URINE: NEGATIVE MG/DL
LEUKOCYTE ESTERASE, URINE: ABNORMAL
LIPASE: 23 U/L (ref 13–60)
LYMPHOCYTES ABSOLUTE: 1 K/UL (ref 1–5.1)
LYMPHOCYTES RELATIVE PERCENT: 22 %
MAGNESIUM: 1.5 MG/DL (ref 1.8–2.4)
MCH RBC QN AUTO: 33.8 PG (ref 26–34)
MCHC RBC AUTO-ENTMCNC: 34.3 G/DL (ref 31–36)
MCV RBC AUTO: 98.6 FL (ref 80–100)
MICROSCOPIC EXAMINATION: YES
MONOCYTES ABSOLUTE: 0.8 K/UL (ref 0–1.3)
MONOCYTES RELATIVE PERCENT: 17.4 %
NEUTROPHILS ABSOLUTE: 2.7 K/UL (ref 1.7–7.7)
NEUTROPHILS RELATIVE PERCENT: 58.7 %
NITRITE, URINE: NEGATIVE
PDW BLD-RTO: 13.8 % (ref 12.4–15.4)
PH UA: 7 (ref 5–8)
PLATELET # BLD: 277 K/UL (ref 135–450)
PMV BLD AUTO: 8.3 FL (ref 5–10.5)
POTASSIUM REFLEX MAGNESIUM: 3.4 MMOL/L (ref 3.5–5.1)
PROTEIN UA: NEGATIVE MG/DL
RBC # BLD: 3.85 M/UL (ref 4–5.2)
RBC UA: NORMAL /HPF (ref 0–2)
SODIUM BLD-SCNC: 135 MMOL/L (ref 136–145)
SPECIFIC GRAVITY UA: <=1.005 (ref 1–1.03)
TOTAL PROTEIN: 7.4 G/DL (ref 6.4–8.2)
URINE TYPE: ABNORMAL
UROBILINOGEN, URINE: 0.2 E.U./DL
WBC # BLD: 4.6 K/UL (ref 4–11)
WBC UA: NORMAL /HPF (ref 0–5)

## 2019-10-31 PROCEDURE — 85025 COMPLETE CBC W/AUTO DIFF WBC: CPT

## 2019-10-31 PROCEDURE — 83735 ASSAY OF MAGNESIUM: CPT

## 2019-10-31 PROCEDURE — 6360000002 HC RX W HCPCS: Performed by: EMERGENCY MEDICINE

## 2019-10-31 PROCEDURE — 83690 ASSAY OF LIPASE: CPT

## 2019-10-31 PROCEDURE — 2580000003 HC RX 258: Performed by: EMERGENCY MEDICINE

## 2019-10-31 PROCEDURE — 74177 CT ABD & PELVIS W/CONTRAST: CPT

## 2019-10-31 PROCEDURE — 96366 THER/PROPH/DIAG IV INF ADDON: CPT

## 2019-10-31 PROCEDURE — 96375 TX/PRO/DX INJ NEW DRUG ADDON: CPT

## 2019-10-31 PROCEDURE — 99283 EMERGENCY DEPT VISIT LOW MDM: CPT

## 2019-10-31 PROCEDURE — 96365 THER/PROPH/DIAG IV INF INIT: CPT

## 2019-10-31 PROCEDURE — 96361 HYDRATE IV INFUSION ADD-ON: CPT

## 2019-10-31 PROCEDURE — 81001 URINALYSIS AUTO W/SCOPE: CPT

## 2019-10-31 PROCEDURE — 6360000004 HC RX CONTRAST MEDICATION: Performed by: EMERGENCY MEDICINE

## 2019-10-31 PROCEDURE — 80053 COMPREHEN METABOLIC PANEL: CPT

## 2019-10-31 RX ORDER — 0.9 % SODIUM CHLORIDE 0.9 %
500 INTRAVENOUS SOLUTION INTRAVENOUS ONCE
Status: COMPLETED | OUTPATIENT
Start: 2019-10-31 | End: 2019-10-31

## 2019-10-31 RX ORDER — MAGNESIUM SULFATE IN WATER 40 MG/ML
2 INJECTION, SOLUTION INTRAVENOUS ONCE
Status: COMPLETED | OUTPATIENT
Start: 2019-10-31 | End: 2019-10-31

## 2019-10-31 RX ORDER — MAGNESIUM SULFATE HEPTAHYDRATE 500 MG/ML
2 INJECTION, SOLUTION INTRAMUSCULAR; INTRAVENOUS ONCE
Status: DISCONTINUED | OUTPATIENT
Start: 2019-10-31 | End: 2019-10-31 | Stop reason: CLARIF

## 2019-10-31 RX ADMIN — MAGNESIUM SULFATE HEPTAHYDRATE 2 G: 40 INJECTION, SOLUTION INTRAVENOUS at 17:31

## 2019-10-31 RX ADMIN — IOPAMIDOL 75 ML: 755 INJECTION, SOLUTION INTRAVENOUS at 18:07

## 2019-10-31 RX ADMIN — SODIUM CHLORIDE 500 ML: 9 INJECTION, SOLUTION INTRAVENOUS at 16:19

## 2019-10-31 RX ADMIN — HYDROMORPHONE HYDROCHLORIDE 0.5 MG: 1 INJECTION, SOLUTION INTRAMUSCULAR; INTRAVENOUS; SUBCUTANEOUS at 17:31

## 2019-10-31 ASSESSMENT — PAIN DESCRIPTION - PAIN TYPE: TYPE: CHRONIC PAIN

## 2019-10-31 ASSESSMENT — ENCOUNTER SYMPTOMS
RHINORRHEA: 0
ABDOMINAL DISTENTION: 1
DIARRHEA: 0
NAUSEA: 1
WHEEZING: 0
BACK PAIN: 0
COUGH: 0
CONSTIPATION: 1
VOMITING: 0
SHORTNESS OF BREATH: 0
PHOTOPHOBIA: 0
ABDOMINAL PAIN: 1

## 2019-10-31 ASSESSMENT — PAIN SCALES - GENERAL: PAINLEVEL_OUTOF10: 8

## 2019-10-31 ASSESSMENT — PAIN DESCRIPTION - LOCATION: LOCATION: BACK

## 2021-04-30 ENCOUNTER — HOSPITAL ENCOUNTER (EMERGENCY)
Age: 76
Discharge: HOME | End: 2021-04-30
Payer: MEDICARE

## 2021-04-30 VITALS
HEIGHT: 68 IN | WEIGHT: 230 LBS | HEART RATE: 70 BPM | DIASTOLIC BLOOD PRESSURE: 85 MMHG | RESPIRATION RATE: 16 BRPM | TEMPERATURE: 98.1 F | OXYGEN SATURATION: 98 % | SYSTOLIC BLOOD PRESSURE: 151 MMHG | BODY MASS INDEX: 34.86 KG/M2

## 2021-04-30 DIAGNOSIS — S81.812A LACERATION OF LEFT LOWER EXTREMITY, INITIAL ENCOUNTER: Primary | ICD-10-CM

## 2021-04-30 PROCEDURE — 99284 EMERGENCY DEPT VISIT MOD MDM: CPT

## 2021-04-30 PROCEDURE — 6370000000 HC RX 637 (ALT 250 FOR IP): Performed by: PHYSICIAN ASSISTANT

## 2021-04-30 RX ORDER — ACETAMINOPHEN 325 MG/1
650 TABLET ORAL ONCE
Status: COMPLETED | OUTPATIENT
Start: 2021-04-30 | End: 2021-04-30

## 2021-04-30 RX ADMIN — ACETAMINOPHEN 650 MG: 325 TABLET ORAL at 13:43

## 2021-04-30 ASSESSMENT — PAIN DESCRIPTION - ORIENTATION: ORIENTATION: LEFT

## 2021-04-30 ASSESSMENT — PAIN SCALES - GENERAL
PAINLEVEL_OUTOF10: 4
PAINLEVEL_OUTOF10: 8

## 2021-04-30 ASSESSMENT — PAIN DESCRIPTION - PAIN TYPE
TYPE: ACUTE PAIN
TYPE: ACUTE PAIN

## 2021-04-30 ASSESSMENT — PAIN DESCRIPTION - LOCATION
LOCATION: LEG
LOCATION: LEG

## 2021-04-30 ASSESSMENT — PAIN DESCRIPTION - DESCRIPTORS: DESCRIPTORS: BURNING;THROBBING

## 2021-04-30 NOTE — ED PROVIDER NOTES
[facility] Emergency Department    CHIEF COMPLAINT  Wound Check (pt got her leg stuck between an electric chair and step, pt is on blood thinners, bleed is controlled )      SHARED SERVICE VISIT:  Evaluated by MATIAS. My supervising physician was available for consultation. HISTORY OF PRESENT ILLNESS  Davy Torres is a 67 y.o. female who presents to the ED complaining of laceration to her left leg. She states she got it stuck between her chair lift and the wall. She is currently on blood thinners, but bleeding is controlled. She rates her pain as an 8/10. She has chronically swollen BLE and denies increase in swelling. She states she did not take her water pill yet today. No other complaints, modifying factors or associated symptoms. Nursing notes reviewed.    Past Medical History:   Diagnosis Date    Atrial fibrillation (HCC)     CAD (coronary artery disease)     atrial fib    Cancer (HCC)     uterine    Joint pain     Joint swelling     Morning stiffness of joints     Muscle weakness     Osteoarthritis     Osteoporosis      Past Surgical History:   Procedure Laterality Date    BACK SURGERY      x2    COLONOSCOPY      COLONOSCOPY  2015    polyp    HYSTERECTOMY      TONSILLECTOMY       Family History   Problem Relation Age of Onset    Cancer Mother         breast    Cancer Father         colon    Cancer Sister         breast    Cancer Other      Social History     Socioeconomic History    Marital status:      Spouse name: Not on file    Number of children: Not on file    Years of education: Not on file    Highest education level: Not on file   Occupational History    Not on file   Social Needs    Financial resource strain: Not on file    Food insecurity     Worry: Not on file     Inability: Not on file    Transportation needs     Medical: Not on file     Non-medical: Not on file   Tobacco Use    Smoking status: Former Smoker     Quit date: to be negative    PHYSICAL EXAM  BP (!) 151/85   Pulse 72   Temp 98.1 °F (36.7 °C) (Oral)   Resp 18   Ht 5' 8\" (1.727 m)   Wt 230 lb (104.3 kg)   SpO2 96%   BMI 34.97 kg/m²   GENERAL APPEARANCE: Awake and alert. Cooperative. No acute distress. HEAD: Normocephalic. Atraumatic. EYES: PERRL. EOM's grossly intact. ENT: Mucous membranes are moist.   NECK: Supple. Normal ROM. CHEST: Equal symmetric chest rise. LUNGS: Breathing is unlabored. Speaking comfortably in full sentences. Abdomen: Nondistended  EXTREMITIES: MAEE. No acute deformities. Superficial skin tear medial left lower extremity. Bleeding controlled. BLE edema. Compartment is soft, nontender, without obvious deformities or step-offs. No crepitus. Pedal pulses 2+. Cap refill is less than 2 seconds. SKIN: Warm and dry. NEUROLOGICAL: Alert and oriented. Strength is 5/5 in all extremities and sensation is intact. RADIOLOGY  No results found. ED COURSE   Patient received tylenol for pain, with good relief. Patient presents with a superficial skin tear on her left leg that was cleaned with chlorhexidine and normal saline by nursing staff. A non adherent dressing was applied. A discussion was had with Ms. Summers regarding wound care. Risk management discussed and shared decision making had with patient and/or surrogate. Patient will follow up with PCP for further evaluation/treatment. Patient will return to ED for new/worsening symptoms. MDM    I estimate there is LOW risk for CELLULITIS, COMPARTMENT SYNDROME, NECROTIZING FASCIITIS, TENDON OR NEUROVASCULAR INJURY, or FOREIGN BODY, thus I consider the discharge disposition reasonable. Also, there is no evidence or peritonitis, sepsis, or toxicity. Cathryn Harrington and I have discussed the diagnosis and risks, and we agree with discharging home to follow-up with their primary doctor.  We also discussed returning to the Emergency Department immediately if new or worsening symptoms occur. We have discussed the symptoms which are most concerning (e.g., changing or worsening pain, fever, numbness, weakness, cool or painful digits) that necessitate immediate return. Final Impression    1. Laceration of left lower extremity, initial encounter        Discharge Vital Signs:  Blood pressure (!) 151/85, pulse 70, temperature 98.1 °F (36.7 °C), temperature source Oral, resp. rate 16, height 5' 8\" (1.727 m), weight 230 lb (104.3 kg), SpO2 98 %. DISPOSITION  Patient was discharged to home in good condition.            Paul Whitley, [address]  04/30/21 1500

## 2021-04-30 NOTE — ED NOTES
Pt wound covered with non adherent dressing. Pt instructed on wound care and verbalized understanding.       Naeem Fiore RN  04/30/21 4727

## 2021-04-30 NOTE — ED NOTES
Pt wound cleaned with chlorhexidine and normal saline. Pt tolerated well.       Yi Alarcon RN  04/30/21 6224

## 2021-04-30 NOTE — ED NOTES
--Patient provided with discharge instructions and any prescriptions. --Instructions, dosing, and follow-up appointments reviewed with patient/family. No further questions or needs at this time. --Vital signs and patient stable upon discharge. --Patient ambulatory to [facility].        Jason Escobar RN  04/30/21 8004

## 2021-10-20 ENCOUNTER — APPOINTMENT (OUTPATIENT)
Dept: GENERAL RADIOLOGY | Age: 76
End: 2021-10-20
Payer: MEDICARE

## 2021-10-20 ENCOUNTER — HOSPITAL ENCOUNTER (EMERGENCY)
Age: 76
Discharge: HOME | End: 2021-10-20
Attending: EMERGENCY MEDICINE
Payer: MEDICARE

## 2021-10-20 VITALS
BODY MASS INDEX: 33.34 KG/M2 | OXYGEN SATURATION: 96 % | RESPIRATION RATE: 16 BRPM | SYSTOLIC BLOOD PRESSURE: 128 MMHG | TEMPERATURE: 97.8 F | HEIGHT: 68 IN | HEART RATE: 90 BPM | DIASTOLIC BLOOD PRESSURE: 69 MMHG | WEIGHT: 220 LBS

## 2021-10-20 DIAGNOSIS — M54.50 ACUTE EXACERBATION OF CHRONIC LOW BACK PAIN: Primary | ICD-10-CM

## 2021-10-20 DIAGNOSIS — G89.29 ACUTE EXACERBATION OF CHRONIC LOW BACK PAIN: Primary | ICD-10-CM

## 2021-10-20 LAB
BACTERIA: ABNORMAL /HPF
BILIRUBIN URINE: NEGATIVE
BLOOD, URINE: ABNORMAL
CLARITY: CLEAR
COLOR: YELLOW
EPITHELIAL CELLS, UA: ABNORMAL /HPF (ref 0–5)
GLUCOSE URINE: NEGATIVE MG/DL
KETONES, URINE: NEGATIVE MG/DL
LEUKOCYTE ESTERASE, URINE: NEGATIVE
MICROSCOPIC EXAMINATION: YES
NITRITE, URINE: NEGATIVE
PH UA: 6 (ref 5–8)
PROTEIN UA: NEGATIVE MG/DL
RBC UA: ABNORMAL /HPF (ref 0–4)
SPECIFIC GRAVITY UA: 1.02 (ref 1–1.03)
URINE TYPE: ABNORMAL
UROBILINOGEN, URINE: 0.2 E.U./DL
WBC UA: ABNORMAL /HPF (ref 0–5)

## 2021-10-20 PROCEDURE — 6370000000 HC RX 637 (ALT 250 FOR IP): Performed by: PHYSICIAN ASSISTANT

## 2021-10-20 PROCEDURE — 81001 URINALYSIS AUTO W/SCOPE: CPT

## 2021-10-20 PROCEDURE — 99285 EMERGENCY DEPT VISIT HI MDM: CPT

## 2021-10-20 RX ORDER — METHOCARBAMOL 500 MG/1
500 TABLET, FILM COATED ORAL 3 TIMES DAILY
Qty: 15 TABLET | Refills: 0 | Status: SHIPPED | OUTPATIENT
Start: 2021-10-20 | End: 2021-10-25

## 2021-10-20 RX ORDER — TRAMADOL HYDROCHLORIDE 50 MG/1
TABLET ORAL
COMMUNITY
Start: 2021-10-13

## 2021-10-20 RX ORDER — TRAMADOL HYDROCHLORIDE 50 MG/1
50 TABLET ORAL ONCE
Status: COMPLETED | OUTPATIENT
Start: 2021-10-20 | End: 2021-10-20

## 2021-10-20 RX ORDER — METHOCARBAMOL 750 MG/1
750 TABLET, FILM COATED ORAL ONCE
Status: COMPLETED | OUTPATIENT
Start: 2021-10-20 | End: 2021-10-20

## 2021-10-20 RX ADMIN — TRAMADOL HYDROCHLORIDE 50 MG: 50 TABLET, FILM COATED ORAL at 15:34

## 2021-10-20 RX ADMIN — METHOCARBAMOL TABLETS 750 MG: 750 TABLET, COATED ORAL at 14:00

## 2021-10-20 ASSESSMENT — PAIN DESCRIPTION - PROGRESSION: CLINICAL_PROGRESSION: GRADUALLY IMPROVING

## 2021-10-20 ASSESSMENT — PAIN DESCRIPTION - LOCATION: LOCATION: BACK

## 2021-10-20 ASSESSMENT — PAIN SCALES - GENERAL
PAINLEVEL_OUTOF10: 5
PAINLEVEL_OUTOF10: 7

## 2021-10-20 NOTE — ED PROVIDER NOTES
I independently performed a history and physical on Roxana Beasley. All diagnostic, treatment, and disposition decisions were made by myself in conjunction with the advanced practice provider. For further details of [facility] emergency department encounter, please see BIENVENIDO Nowak's documentation. Patient is a 41-year-old female presenting today with chronic low back pain associated with intermittent discomfort radiating down her left leg but having a recent flareup over the last month. She saw pain management with [facility] 2 weeks ago and had assistance for back pain at this point but reports over the last 3 days it seems to have worsened again. She denies any new numbness or weakness in the arms or legs. No chest pain or shortness of breath. No abdominal pain. No vomiting. No urinary complaints. She denied wanting any imaging today and is aware that she has a history of osteoporosis and could easily have a new fracture based on her brittle bones. She denies any urinary incontinence. No fever. By the time I saw her, her pain was overall controlled and she would prefer to go home and follow-up as an outpatient. I did do a chart review and in 2019 she had a CT and I did review this imaging on PACS and it did not appear that she had any signs of a AAA at that time and nothing was mentioned of one by radiology. She reports that she is normally only able to get around with a scooter based on the issues with her back pain and is unable to normally walk on her own and she currently feels back to her baseline. Physical:   Gen: No acute distress. AOx3.   Psych: Normal mood and affect  HEENT: NCAT  Neck: supple, NTTP  Back: mild tenderness to right lumbar paraspinal region, no obvious midline tenderness to palpation   Cardiac: RRR, pulses 2+ in upper extremities  Lungs: C2AB, no R/R/W  Abdomen: soft and nontender with no R/D/G, no CVAT  Neuro: no focal neuro deficits with strength and sensation 5/5 in all 4 extremities  Skin: no rashes to back noted     MDM: Patient was evaluated due to concern for increasing back pain radiating into the left leg today similar to prior flareups. By the time I saw her, her pain had mostly returned to baseline and she was hoping to go home. I did inform her of possibility of new compression fracture that could have occurred but she reports having recent x-ray earlier this month that does not want any further imaging and is willing to accept the risk that something could be missed today. She had no abdominal pain or chest pain and story not suggestive of acute coronary syndrome or dissection or AAA. She is aware that if back pain worsens associate with new or different symptoms, she starts having obvious weakness in the legs or incontinence, develops a new fever, or any other concerns, then return immediately to the ED, but otherwise follow-up as an outpatient with her pain management specialist and orthopedics. She was in no acute distress at time of discharge and the patient along with her son felt comfortable this plan. Urinalysis was negative for infection. Story not suggestive of kidney stone.      Fabian Chairez MD  10/21/21 1930

## 2021-10-20 NOTE — ED PROVIDER NOTES
[facility] Emergency Department    CHIEF COMPLAINT  Back Pain (Pt reports back pain over the past 3-4 weeks without acute injury. Pt reports saw pain management  through [id number] [facility] 2 weeks ago, recieved injections, Tramadol script. Pt reports increasing pain over the past few days. )      SHARED SERVICE VISIT  Evaluated by MATIAS. My supervising physician was available for consultation. HISTORY OF PRESENT ILLNESS  Judson Balderrama is a 67 y.o. female with a past medical history of atrial fibrillation, CAD, uterine cancer, osteoarthritis and osteoporosis who presents to the ED complaining of right-sided low back pain and left-sided leg pain. The patient has a history of back pain which she sees pain management for. She states she had injections done on left and right approximately 3 weeks ago which helped with her hip and leg pain however she has continued to have back. .  She was prescribed tramadol states it has not been helping. She denies any recent injuries. She denies any numbness or tingling, no saddle anesthesia, no urinary or bowel incontinence. She states beginning last night she started having left hip and leg pain again. Currently rating it as a 5/10. Worse with weight bearing. The patient is primarily wheelchair-bound and lives in skilled nursing facility. No other complaints, modifying factors or associated symptoms. Nursing notes reviewed.    Past Medical History:   Diagnosis Date    Atrial fibrillation (HCC)     CAD (coronary artery disease)     atrial fib    Cancer (HCC)     uterine    Joint pain     Joint swelling     Morning stiffness of joints     Muscle weakness     Osteoarthritis     Osteoporosis      Past Surgical History:   Procedure Laterality Date    BACK SURGERY      x2    COLONOSCOPY      COLONOSCOPY  2015    polyp    HYSTERECTOMY      TONSILLECTOMY       Family History   Problem Relation Age of Onset    Cancer Mother         breast  Cancer Father         colon    Cancer Sister         breast    Cancer Other      Social History     Socioeconomic History    Marital status:      Spouse name: Not on file    Number of children: Not on file    Years of education: Not on file    Highest education level: Not on file   Occupational History    Not on file   Tobacco Use    Smoking status: Former Smoker     Quit date: 1967     Years since quittin.8    Smokeless tobacco: Never Used   Substance and Sexual Activity    Alcohol use: Yes     Comment: 1 glass wine qd    Drug use: Not on file    Sexual activity: Not on file   Other Topics Concern    Not on file   Social History Narrative    Not on file     Social Determinants of Health     Financial Resource Strain:     Difficulty of Paying Living Expenses:    Food Insecurity:     Worried About Running Out of Food in the Last Year:     [address] in the Last Year:    Transportation Needs:     Lack of Transportation (Medical):  Lack of Transportation (Non-Medical):    Physical Activity:     Days of Exercise per Week:     Minutes of Exercise per Session:    Stress:     Feeling of Stress :    Social Connections:     Frequency of Communication with Friends and Family:     Frequency of Social Gatherings with Friends and Family:     Attends [employer] Services:     Active Member of Clubs or Organizations:     Attends Club or Organization Meetings:     Marital Status:    Intimate Partner Violence:     Fear of Current or Ex-Partner:     Emotionally Abused:     Physically Abused:     Sexually Abused:      No current facility-administered medications for this encounter. Current Outpatient Medications   Medication Sig Dispense Refill    traMADol (ULTRAM) 50 MG tablet       predniSONE (DELTASONE) 20 MG tablet       metoprolol (TOPROL-XL) 25 MG XL tablet Take 25 mg by mouth daily      apixaban (ELIQUIS) 5 MG TABS tablet Take  by mouth 2 times daily.       vitamin D (CHOLECALCIFEROL) 1000 UNIT TABS tablet Take 1,000 Units by mouth daily.  pravastatin (PRAVACHOL) 80 MG tablet Take 80 mg by mouth daily.  multivitamin (THERAGRAN) per tablet Take 1 tablet by mouth daily.  furosemide (LASIX) 20 MG tablet Take 20 mg by mouth every other day.  diltiazem (CARDIZEM SR) 90 MG SR capsule Take 360 mg by mouth 2 times daily.  potassium chloride (KLOR-CON) 10 MEQ CR tablet Take 10 mEq by mouth daily. No Known Allergies    REVIEW OF SYSTEMS  6 systems reviewed, pertinent positives per HPI otherwise noted to be negative    PHYSICAL EXAM  BP (!) 141/68   Pulse 98   Temp 97.8 °F (36.6 °C) (Oral)   Resp 16   Ht 5' 8\" (1.727 m)   Wt 220 lb (99.8 kg)   SpO2 98%   BMI 33.45 kg/m²   GENERAL APPEARANCE: Awake and alert. Cooperative. No acute distress. HEAD: Normocephalic. Atraumatic. EYES: PERRL. EOM's grossly intact. ENT: Mucous membranes are moist.   NECK: Supple. Normal ROM. CHEST: Equal symmetric chest rise. LUNGS: Breathing is unlabored. Speaking comfortably in full sentences. Abdomen: Nondistended and nontender. No rigidity, guarding, rebound. No midline pulsatile mass. BACK: On exam of cervical, lumbar, thoracic spine, there is no swelling, bruising, or color change noted. There is no midline bony tenderness, without crepitus, deformity, or step off. Patient exhibits tenderness of paraspinal musculature to right of midline. There is moderate point tenderness over the SI Joint. Unremarkable straight leg raise bilaterally  EXTREMITIES: MAEE. No acute deformities. Distal pulses +2 and cap refill less than 5 seconds. SKIN: Warm and dry. Rashes, clubbing, or cyanosis. NEUROLOGICAL: Alert and oriented. Strength is 5/5 in all extremities and sensation is intact. Patient observed ambulating to the emergency department without difficulty.     Wei Christensen MD - 10/12/2021 11:40 AM EDT  regarding conservative management. She was given Robaxin and home dose of Tramadol while in the emergency department. She did have significant relief with medication. I did agree to prescribe short course of Robaxin and encouraged her to take her tramadol as prescribed/.  We did discuss outpatient physical therapy, I did offer for patient to be evaluated by PT/OT while in the ED, but they deferred to follow-up with primary care physician for referral.    Risk management discussed and shared decision making had with patient and/or surrogate. All questions were answered. Patient will follow up with  PCP for further evaluation/treatment. Patient will return to ED for new/worsening symptoms. MDM  Results for orders placed or performed during the hospital encounter of 10/20/21   Urinalysis, reflex to microscopic   Result Value Ref Range    Color, UA Yellow Straw/Yellow    Clarity, UA Clear Clear    Glucose, Ur Negative Negative mg/dL    Bilirubin Urine Negative Negative    Ketones, Urine Negative Negative mg/dL    Specific Gravity, UA 1.025 1.005 - 1.030    Blood, Urine TRACE-INTACT (A) Negative    pH, UA 6.0 5.0 - 8.0    Protein, UA Negative Negative mg/dL    Urobilinogen, Urine 0.2 <2.0 E.U./dL    Nitrite, Urine Negative Negative    Leukocyte Esterase, Urine Negative Negative    Microscopic Examination YES     Urine Type NotGiven    Microscopic Urinalysis   Result Value Ref Range    WBC, UA 3-5 0 - 5 /HPF    RBC, UA 0-2 0 - 4 /HPF    Epithelial Cells, UA 2-5 0 - 5 /HPF    Bacteria, UA 1+ (A) None Seen /HPF       I estimate there is LOW risk for ABDOMINAL AORTIC ANEURYSM, CAUDA EQUINA SYNDROME, EPIDURAL MASS LESION, SPINAL STENOSIS, OR HERNIATED DISK CAUSING SEVERE STENOSIS, thus I consider the discharge disposition reasonable. Bladimir Hassan and I have discussed the diagnosis and risks, and we agree with discharging home to follow-up with their primary doctor.  We also discussed returning to the Emergency Department immediately if new or worsening symptoms occur. We have discussed the symptoms which are most concerning (e.g., saddle anesthesia, urinary or bowel incontinence or retention, changing or worsening pain) that necessitate immediate return. Final Impression    1. Acute exacerbation of chronic low back pain        Blood pressure 128/69, pulse 90, temperature 97.8 °F (36.6 °C), temperature source Oral, resp. rate 16, height 5' 8\" (1.727 m), weight 220 lb (99.8 kg), SpO2 96 %. DISPOSITION  Patient was discharged to home in good condition.           Sharon Iglesias, [address]  10/20/21 9167

## 2022-08-29 ENCOUNTER — OFFICE VISIT (OUTPATIENT)
Dept: PULMONOLOGY | Age: 77
End: 2022-08-29
Payer: MEDICARE

## 2022-08-29 VITALS
HEIGHT: 68 IN | HEART RATE: 94 BPM | BODY MASS INDEX: 33.49 KG/M2 | DIASTOLIC BLOOD PRESSURE: 74 MMHG | SYSTOLIC BLOOD PRESSURE: 138 MMHG | WEIGHT: 221 LBS | OXYGEN SATURATION: 97 % | TEMPERATURE: 98 F | RESPIRATION RATE: 16 BRPM

## 2022-08-29 DIAGNOSIS — G47.33 OSA (OBSTRUCTIVE SLEEP APNEA): Primary | ICD-10-CM

## 2022-08-29 DIAGNOSIS — G47.10 HYPERSOMNOLENCE: ICD-10-CM

## 2022-08-29 DIAGNOSIS — I48.91 ATRIAL FIBRILLATION, UNSPECIFIED TYPE (HCC): ICD-10-CM

## 2022-08-29 DIAGNOSIS — E66.9 CLASS 1 OBESITY WITHOUT SERIOUS COMORBIDITY WITH BODY MASS INDEX (BMI) OF 33.0 TO 33.9 IN ADULT, UNSPECIFIED OBESITY TYPE: ICD-10-CM

## 2022-08-29 PROCEDURE — 1123F ACP DISCUSS/DSCN MKR DOCD: CPT | Performed by: INTERNAL MEDICINE

## 2022-08-29 PROCEDURE — 99214 OFFICE O/P EST MOD 30 MIN: CPT | Performed by: INTERNAL MEDICINE

## 2022-08-29 PROCEDURE — G8417 CALC BMI ABV UP PARAM F/U: HCPCS | Performed by: INTERNAL MEDICINE

## 2022-08-29 PROCEDURE — G8427 DOCREV CUR MEDS BY ELIG CLIN: HCPCS | Performed by: INTERNAL MEDICINE

## 2022-08-29 PROCEDURE — 1036F TOBACCO NON-USER: CPT | Performed by: INTERNAL MEDICINE

## 2022-08-29 PROCEDURE — 1090F PRES/ABSN URINE INCON ASSESS: CPT | Performed by: INTERNAL MEDICINE

## 2022-08-29 PROCEDURE — G8400 PT W/DXA NO RESULTS DOC: HCPCS | Performed by: INTERNAL MEDICINE

## 2022-08-29 RX ORDER — OMEPRAZOLE 20 MG/1
20 CAPSULE, DELAYED RELEASE ORAL DAILY
COMMUNITY

## 2022-08-29 RX ORDER — ATORVASTATIN CALCIUM 80 MG/1
80 TABLET, FILM COATED ORAL DAILY
COMMUNITY

## 2022-08-29 ASSESSMENT — ENCOUNTER SYMPTOMS
EYES NEGATIVE: 1
ALLERGIC/IMMUNOLOGIC NEGATIVE: 1
RESPIRATORY NEGATIVE: 1
GASTROINTESTINAL NEGATIVE: 1

## 2022-08-29 NOTE — PATIENT INSTRUCTIONS
ASSESSMENT/PLAN:   Diagnosis Orders   1. ODETTE (obstructive sleep apnea)        2. Class 1 obesity without serious comorbidity with body mass index (BMI) of 33.0 to 33.9 in adult, unspecified obesity type        3. Hypersomnolence        4. Atrial fibrillation, unspecified type ([facility] [address]          Advised to avoid driving when too sleepy to function safely and given a discussion of the risks of untreated apnea such as accidents, cognitive impairment, mood impairment, high blood pressure, various cardiac diseases and stroke. Weight loss was encouraged. afib is chronic but controlled    PSG done in 9/26/10  ahi was 23  Moderate odette    cpap titration done 10/20/10  Wt was 195 lbs  cpap was set 8 cwp      autopap is set at min of 7 and max of 12  epr is 3  Standard response    Nasal pillows    Using 97% of time  Using 6.5 h/night    90% pressure is 10  Leak at 37 lpm  Ahi is 5.7    I have access via [employer]    Will change the   Min to 9 and max of 13  Call back about change in pressure       Will need new supplies    RTC in  1 year      CLEANING INSTRUCTIONS     Keeping your equipment and supplies clean is very important. REMINDER: Only use DISTILLED WATER in your humidifier, Empty water daily. DAILY  Mask and tubing:   · Wash your face before applying mask  · Wash mask and tubing with baby shampoo and warm water. Humidifier:   · Empty water in reservoir  · Clean with baby shampoo and warm water  · Rinse, then air dry    WEEKLY  Mask and tubing:   · Soak your mask and tubing in 1 part vinegar and 3 parts water for 30 minutes. Rinse, and allow to air dry. Humidifier:   · Wash with warm water and baby shampoo  · Soak in 1 part vinegar and 3 parts water for 30 minutes  · Rinse with warm water and allow to air dry    Machine Exterior:   · Wipe with a clean damp cloth    MONTHLY AND/OR AS NEEDED  · Reusable foam filters (black filter)- wash in warm water with baby shampoo.  Rinse well and dry with paper towel  · Disposable felt filter (white filter)- Replace filter every two weeks to once a month    NOTE: If you are having repeated sinus and /or respiratory infections, dirty equipment may be the cause. It may help to clean and disinfect your equipment more frequently    TRAVELING  Always make sure the humidifier is empty when traveling. (including doctor appointments, air travel or long distance driving). When flying, always carry your PAP device with you as a carry on item, NEVER check it in as baggage on airlines. You will have to remove the CPAP unit so that it can be tested by the TSA. We can provide you with a letter stating it is a medical device, talk to your provider. Always make sure you have your mask and tubing with you. You will need appropriate plug adapters when traveling outside the [place]. If flying and unable to carry distilled water with you do not use tap water but instead use bottled water (no more than 2 weeks). PAP SUPPLY REPLACEMENT SCHEDULE    To get the most benefit from your PAP therapy, your equipment should be replaced when necessary based on wear and tear. For example, your mask may need to be replaced if you notice it is cracked or the seal is leaking. If your tubing is torn, it needs to be replaced. If you equipment is showing signs of wear, you may be entitled to replace it. The replacement schedule for Medicare patients is shown below. If you are NOT a Medicare patient, please check with your DME (Durable Medical Supply) provider for your individual insurance policy's replacement schedule.      Equipment Medicare My Insurance Plan  Mask 1 per 3 months _______________  Nasal replacement cushion 1 per month _______________  Pillows replacement cushion 1 per month _______________  Full-face cushion 1 per month _______________  Headgear 1 per 6 months _______________  Chinstrap 1 per 6 months _______________  Water chamber 1 per 6 months _______________  Tubing 1

## 2022-08-29 NOTE — PROGRESS NOTES
Isaac Sandoval (: 1945 ) is a 65 y.o. female here for an evaluation of   Chief Complaint   Patient presents with    Sleep Apnea     Former [address] [facility] pt         ASSESSMENT/PLAN:   Diagnosis Orders   1. ODETTE (obstructive sleep apnea)        2. Class 1 obesity without serious comorbidity with body mass index (BMI) of 33.0 to 33.9 in adult, unspecified obesity type        3. Hypersomnolence        4. Atrial fibrillation, unspecified type ([address]          Advised to avoid driving when too sleepy to function safely and given a discussion of the risks of untreated apnea such as accidents, cognitive impairment, mood impairment, high blood pressure, various cardiac diseases and stroke. Weight loss was encouraged. afib is chronic but controlled    PSG done in 9/26/10  ahi was 23  Moderate odette    cpap titration done 10/20/10  Wt was 195 lbs  cpap was set 8 cwp      autopap is set at min of 7 and max of 12  epr is 3  Standard response    Nasal pillows    Using 97% of time  Using 6.5 h/night    90% pressure is 10  Leak at 37 lpm  Ahi is 5.7    I have access via [employer]    Will change the   Min to 9 and max of 13  Call back about change in pressure       Will need new supplies    RTC in  1 year        No follow-ups on file. I have personally reviewed and summarized the old records       I have reviewed the lab tests, radiology reports and medications    I have downloaded and interpreted the cpap/bipap/pap data. I have made adjustments as described    Reviewed present meds and side effects. Continue present meds. Stay compliant. Call if worsens. Reviewed proper inhaler usage    Will ask for old records     Patient understands that smoking is aggravating the respiratory disease and must be discontinued.  Patient refuses to d/c smoking             [address] PULMONARY CRITICAL CARE AND SLEEP  [address] [facility]  [address] [facility]  Dept: [phone]  Loc: [phone]     Diagnosis:  [x] ODETTE (ICD-10: G47.33)  o CSA (ICD-10: G47.31)  [] Complex Sleep Apnea (ICD-10: G47.37)  []  (ICD-10: G47.37)  [] Hypoxemia (ICD-10: R09.02)  [] COPD (J44.90)  [] Chronic Respiratory Failure with hypoxemia (J96.11)  [] Chronicr Respiratory Failure with hypercapnia (J96.12)  [] Restrictive Lung Disease (J98.4)      [] New Rx (Device Preference: _________________________)     [x] Change Order     I have changed  [] Replace ___________  [] Clinical assessments and may include IN-Check device, spirometry and ETCO2 PRN    [] Discontinue Order -  [] CPAP    [] BPAP    [] PAP    [] Oxygen   /   AMA?  [] Yes   [] No              Therapy    AHI: ________ MACRINA: ________  LOW SpO2: ________%      DME: patient aids    DEVICE / SETTINGS RAMP / COMFORT INTERFACE   []  CPAP () Pressure    Ramp: _________ min's  [] Ramp to patient preference General PAP Supply Kit  Medicaid does not cover heated tubing  ([employer])  PAP Tubing:  [x] Heated ()- 1/3 mos                         [x] Regular () - 1/3 mos  [x] Disposable Water Chamber () - 1/6 mos  [x] Disposable Filter () - 2/mo  [x] Non-Disposable Filter () - 1/6 mos   []  BiLevel PAP ()           IPAP        []  BiLevel PAP with   ()       Backup Rate ()      EPAP Rate  [] Adjust FLEX to patient comfort       SUPPLEMENTAL OXYGEN  [] OXYGEN:      Liter/min: _________  [] Continuous        [] Nocturnal  [] Bleed into PAP Device      [x]  MyNines 9                  Max Press 13  Mask Interface Kit    [x] Mask interface () - 1/3 mos  [x] Nasal Cushion () - 2/mo  [x] Nasal Pillows ()  -2/mo  [x] Headgear () - 1/6 mos   []  AutoBiLevel () Pressure  ()      Support           []  ResMed® IVAPS EPAP  [] Overnight Oximetry on Room Air  [] Overnight Oximetry on PAP Therapy  [] Overnight Oximetry on ___ LPM of oxygen  []  Overnight Oximetry on  PAP therapy with Frequency of Use:    Daily                 Length of Need: 13 Months              o The patient requires BiLevel PAP and the following apply: []  The patient requires a Respiratory Assist Device (RAD) and the following apply:   o CPAP was tried and failed to meet therapeutic goals. [] CPAP was tried, but failed to meet therapeutic goals   o The prescribed mask interface has been properly fit, is the most comfortable to the patient and will be used with the BPAP device. [] The prescribed mask interface has been properly fit, is the most comfortable to the patient and will be used with the RAD.   o Current CPAP setting prevents patient from tolerating the therapy and lower CPAP settings fail to adequately control the symptoms of ODETTE, improve sleep quality, or reduce AHI to acceptable levels. [] Current CPAP setting prevent patient from tolerating the therapy and lower PAP settings fail to  adequately control ODETTE symptoms, improve sleep quality, or reduce AHI to acceptable levels.          [] There is significant improvement of the respiratory events on the RAD                                                                                                                                                                                                                                  Gallito Dumont MD               NPI- [id number]     [facility]- [phone]                    08/29/22       ____________________________                        _______________________           Physician Signature                                                         Date                                                   SUBJECTIVE/OBJECTIVE:    2020Transfer of care from [facility] to [facility]  Initially seen at [facility] pulmonary and sleep on 8/29/2022  Was seen by me in and then followed with Celina Wynn the nurse practitioner there at [facility] but will be switching to [address] [facility] for sleep care    No snoring  No headache  No bloating  No burping  No chest pain     No ear popping        Some cramps once in a while  No rls  No plms        Going to bed  pm and waking up 6-7 am     Just lay down for 10 min        Afib, always but controlled  Having dry mouth        Review of Systems   Constitutional: Negative. HENT: Negative. Eyes: Negative. Respiratory: Negative. Cardiovascular: Negative. Gastrointestinal: Negative. Endocrine: Negative. Genitourinary: Negative. Musculoskeletal: Negative. Skin: Negative. Allergic/Immunologic: Negative. Neurological: Negative. Hematological: Negative. Psychiatric/Behavioral: Negative. Vitals:    08/29/22 1104   BP: 138/74   Site: Left Upper Arm   Position: Sitting   Cuff Size: Medium Adult   Pulse: 94   Resp: 16   Temp: 98 °F (36.7 °C)   TempSrc: Temporal   SpO2: 97%   Weight: 221 lb (100.2 kg)   Height: 5' 8\" (1.727 m)        Physical Exam  Vitals and nursing note reviewed. Constitutional:       General: She is not in acute distress. Appearance: Normal appearance. She is not ill-appearing. HENT:      Head: Normocephalic and atraumatic. Right Ear: External ear normal.      Left Ear: External ear normal.      Nose: Nose normal.      Comments: Mallampati class III throat     Mouth/Throat:      Mouth: Mucous membranes are moist.      Pharynx: Oropharynx is clear. Comments: Mallampati 3  Eyes:      General: No scleral icterus. Extraocular Movements: Extraocular movements intact. Conjunctiva/sclera: Conjunctivae normal.      Pupils: Pupils are equal, round, and reactive to light. Cardiovascular:      Rate and Rhythm: Normal rate and regular rhythm. Pulses: Normal pulses. Heart sounds: Normal heart sounds. No murmur heard. No friction rub. Pulmonary:      Effort: No respiratory distress. Breath sounds: No wheezing. Abdominal:      General: Abdomen is flat. Bowel sounds are normal. There is no distension.

## 2022-08-29 NOTE — PROGRESS NOTES
MA Communication:   The following orders are received by verbal communication from Ahsan Mckeon MD    Orders include:  Order sent to DME       1 yr fu scheduled with Diana Flores on 828/23

## 2022-08-29 NOTE — LETTER
[address] [facility]  [address] [facility] [address] [address]  Phone: [phone]  Fax: [phone]    Nellie Gannon MD        August 29, 2022     Patient: Chente Blanchard   YOB: 1945   Date of Visit: 8/29/2022 8/29/22        Chente Blanchard      I have seen this patient in the office today and wanted to communicate my findings and recommendations. Patient Instructions         ASSESSMENT/PLAN:   Diagnosis Orders   1. ODETTE (obstructive sleep apnea)        2. Class 1 obesity without serious comorbidity with body mass index (BMI) of 33.0 to 33.9 in adult, unspecified obesity type        3. Hypersomnolence        4. Atrial fibrillation, unspecified type ([address]          Advised to avoid driving when too sleepy to function safely and given a discussion of the risks of untreated apnea such as accidents, cognitive impairment, mood impairment, high blood pressure, various cardiac diseases and stroke. Weight loss was encouraged.     afib is chronic but controlled    PSG done in 9/26/10  ahi was 23  Moderate odette    cpap titration done 10/20/10  Wt was 195 lbs  cpap was set 8 cwp      autopap is set at min of 7 and max of 12  epr is 3  Standard response    Nasal pillows    Using 97% of time  Using 6.5 h/night    90% pressure is 10  Leak at 37 lpm  Ahi is 5.7    I have access via [employer]    Will change the   Min to 9 and max of 13  Call back about change in pressure       Will need new supplies    RTC in  1 year                   Thank you for allowing me to assist in the care of the Applied MD Nellie Thomas MD

## 2022-10-10 ENCOUNTER — TELEPHONE (OUTPATIENT)
Dept: PULMONOLOGY | Age: 77
End: 2022-10-10

## 2022-10-10 DIAGNOSIS — G47.33 OSA (OBSTRUCTIVE SLEEP APNEA): Primary | ICD-10-CM

## 2022-10-10 NOTE — TELEPHONE ENCOUNTER
Patient states that patient aid said they do not have script for her supplies for her cpap machine. Please advise.

## 2023-08-28 ENCOUNTER — OFFICE VISIT (OUTPATIENT)
Dept: PULMONOLOGY | Age: 78
End: 2023-08-28
Payer: MEDICARE

## 2023-08-28 VITALS
SYSTOLIC BLOOD PRESSURE: 140 MMHG | DIASTOLIC BLOOD PRESSURE: 69 MMHG | HEART RATE: 96 BPM | OXYGEN SATURATION: 95 % | TEMPERATURE: 97.6 F | WEIGHT: 221 LBS | BODY MASS INDEX: 33.49 KG/M2 | HEIGHT: 68 IN | RESPIRATION RATE: 16 BRPM

## 2023-08-28 DIAGNOSIS — I48.91 ATRIAL FIBRILLATION, UNSPECIFIED TYPE (HCC): ICD-10-CM

## 2023-08-28 DIAGNOSIS — E66.09 CLASS 1 OBESITY DUE TO EXCESS CALORIES WITH SERIOUS COMORBIDITY AND BODY MASS INDEX (BMI) OF 33.0 TO 33.9 IN ADULT: ICD-10-CM

## 2023-08-28 DIAGNOSIS — G47.10 HYPERSOMNOLENCE: ICD-10-CM

## 2023-08-28 DIAGNOSIS — G47.33 OSA ON CPAP: Primary | ICD-10-CM

## 2023-08-28 DIAGNOSIS — Z99.89 OSA ON CPAP: Primary | ICD-10-CM

## 2023-08-28 PROBLEM — M43.16 SPONDYLOLISTHESIS OF LUMBAR REGION: Status: ACTIVE | Noted: 2019-12-23

## 2023-08-28 PROBLEM — E66.811 CLASS 1 OBESITY DUE TO EXCESS CALORIES WITH SERIOUS COMORBIDITY AND BODY MASS INDEX (BMI) OF 33.0 TO 33.9 IN ADULT: Status: ACTIVE | Noted: 2023-08-28

## 2023-08-28 PROBLEM — S32.010A COMPRESSION FRACTURE OF L1 LUMBAR VERTEBRA (HCC): Status: ACTIVE | Noted: 2019-12-02

## 2023-08-28 PROCEDURE — 1123F ACP DISCUSS/DSCN MKR DOCD: CPT | Performed by: NURSE PRACTITIONER

## 2023-08-28 PROCEDURE — G8427 DOCREV CUR MEDS BY ELIG CLIN: HCPCS | Performed by: NURSE PRACTITIONER

## 2023-08-28 PROCEDURE — 1036F TOBACCO NON-USER: CPT | Performed by: NURSE PRACTITIONER

## 2023-08-28 PROCEDURE — 99213 OFFICE O/P EST LOW 20 MIN: CPT | Performed by: NURSE PRACTITIONER

## 2023-08-28 PROCEDURE — G8400 PT W/DXA NO RESULTS DOC: HCPCS | Performed by: NURSE PRACTITIONER

## 2023-08-28 PROCEDURE — G8417 CALC BMI ABV UP PARAM F/U: HCPCS | Performed by: NURSE PRACTITIONER

## 2023-08-28 PROCEDURE — 1090F PRES/ABSN URINE INCON ASSESS: CPT | Performed by: NURSE PRACTITIONER

## 2023-08-28 RX ORDER — POTASSIUM CHLORIDE 750 MG/1
TABLET, EXTENDED RELEASE ORAL
COMMUNITY
Start: 2023-07-02

## 2023-08-28 RX ORDER — FUROSEMIDE 40 MG/1
TABLET ORAL
COMMUNITY
Start: 2023-05-30

## 2023-08-28 RX ORDER — DILTIAZEM HYDROCHLORIDE 300 MG/1
CAPSULE, COATED, EXTENDED RELEASE ORAL
COMMUNITY
Start: 2023-07-03

## 2023-08-28 ASSESSMENT — ENCOUNTER SYMPTOMS
EYE PAIN: 0
COUGH: 0
WHEEZING: 0
SORE THROAT: 0
SHORTNESS OF BREATH: 1
ABDOMINAL PAIN: 0
CHEST TIGHTNESS: 0
RHINORRHEA: 0

## 2023-08-28 ASSESSMENT — SLEEP AND FATIGUE QUESTIONNAIRES
NECK CIRCUMFERENCE (INCHES): 16
HOW LIKELY ARE YOU TO NOD OFF OR FALL ASLEEP IN A CAR, WHILE STOPPED FOR A FEW MINUTES IN TRAFFIC: 0
HOW LIKELY ARE YOU TO NOD OFF OR FALL ASLEEP WHEN YOU ARE A PASSENGER IN A CAR FOR AN HOUR WITHOUT A BREAK: 0
HOW LIKELY ARE YOU TO NOD OFF OR FALL ASLEEP WHILE SITTING AND READING: 1
HOW LIKELY ARE YOU TO NOD OFF OR FALL ASLEEP WHILE SITTING INACTIVE IN A PUBLIC PLACE: 0
HOW LIKELY ARE YOU TO NOD OFF OR FALL ASLEEP WHILE LYING DOWN TO REST IN THE AFTERNOON WHEN CIRCUMSTANCES PERMIT: 0
HOW LIKELY ARE YOU TO NOD OFF OR FALL ASLEEP WHILE WATCHING TV: 0
HOW LIKELY ARE YOU TO NOD OFF OR FALL ASLEEP WHILE SITTING AND TALKING TO SOMEONE: 0
ESS TOTAL SCORE: 1
HOW LIKELY ARE YOU TO NOD OFF OR FALL ASLEEP WHILE SITTING QUIETLY AFTER LUNCH WITHOUT ALCOHOL: 0

## 2023-08-28 NOTE — PROGRESS NOTES
MA Communication:   The following orders are received by verbal communication from Shani Meredith CNP    Orders include:  1 year follow up and send order for supplies
device usage 0  Percent days with Device Usage 100 %   Percent of days with Usage greater than  4 hours 100 %   Average usage days used 6 hours 43 minutes     AHI 3.4      Exercise/diet: Very little exercise due to mobility. Kadeem Mcdaniel today 221 lbs,  was 221 lbs August 2022 also    Current Outpatient Medications   Medication Sig Dispense Refill    dilTIAZem (CARDIZEM CD) 300 MG extended release capsule       potassium chloride (KLOR-CON M) 10 MEQ extended release tablet       furosemide (LASIX) 40 MG tablet       atorvastatin (LIPITOR) 80 MG tablet Take 80 mg by mouth daily      omeprazole (PRILOSEC) 20 MG delayed release capsule Take 20 mg by mouth daily      Multiple Vitamin (MULTIVITAMIN ADULT PO) Take by mouth daily      Homeopathic Products (ZICAM COLD REMEDY PO) Take by mouth daily as needed      metoprolol (TOPROL-XL) 25 MG XL tablet Take 25 mg by mouth daily      apixaban (ELIQUIS) 5 MG TABS tablet Take  by mouth 2 times daily. vitamin D (CHOLECALCIFEROL) 1000 UNIT TABS tablet Take 1,000 Units by mouth daily. multivitamin (THERAGRAN) per tablet Take 1 tablet by mouth daily. diltiazem (CARDIZEM SR) 90 MG SR capsule Take 360 mg by mouth 2 times daily. potassium chloride (KLOR-CON) 10 MEQ CR tablet Take 10 mEq by mouth daily. No current facility-administered medications for this visit.         No Known Allergies     Past Medical History:   Diagnosis Date    Atrial fibrillation (HCC)     CAD (coronary artery disease)     atrial fib    Cancer (HCC)     uterine    Joint pain     Joint swelling     Morning stiffness of joints     Muscle weakness     Osteoarthritis     Osteoporosis         Past Surgical History:   Procedure Laterality Date    BACK SURGERY      x2    COLONOSCOPY      COLONOSCOPY  01/01/2015    polyp    HYSTERECTOMY (CERVIX STATUS UNKNOWN)      TONSILLECTOMY      TOTAL HIP ARTHROPLASTY Left 04/2022        Review of Systems   Constitutional:  Negative for chills, fatigue

## 2023-08-28 NOTE — PATIENT INSTRUCTIONS
Young Royal has good benefit and adherence on PAP therapy. Compliance report information was analyzed to assess complexity and medical decision making in regards to further testing and management. Will prescribe home medical equipment company to check pressures, download usage and will replace mask, tubing, disposables and filters as needed. Instructed to wash or wipe face of excess oil before using CPAP to prevent the mask / nasal pillow from sliding and ensure proper fit. Empty the water daily and allow the chamber and tubings to air dry. Instructed how to properly clean the device to prevent bacteria and mold growth in the water chamber. Advised to avoid driving if too sleepy to function safely and given a discussion of the risks of untreated apneas such as accidents, cognitive impairment, mood impairment, worsening high blood pressure, various cardiac disease and stroke. Regarding obesity, recommend to try a formal program and/or increase physical activity by adding a 30 minute walk to daily routine. Weight loss was encouraged as a long term approach to treatment of ODETTE. Explained the correlation between obesity and apnea and the causative role it can play. Heart rate controlled today, blood pressure slightly elevated. Continue to monitor. Continue medications per cardiology/PCP. Follow up 1 year or sooner for any issues. Remember to bring a list of pulmonary medications and any CPAP or BiPAP machines to your next appointment with the office. Please keep all of your future appointments scheduled by [facility], [facility] office. Out of respect for other patients and providers, you may be asked to reschedule your appointment if you arrive later than your scheduled appointment time. Appointments cancelled less than 24hrs in advance will be considered a no show.  Patients with three missed appointments within 1 year or four missed appointments within

## 2024-09-06 ENCOUNTER — OFFICE VISIT (OUTPATIENT)
Dept: PULMONOLOGY | Age: 79
End: 2024-09-06

## 2024-09-06 VITALS
OXYGEN SATURATION: 96 % | BODY MASS INDEX: 33.85 KG/M2 | SYSTOLIC BLOOD PRESSURE: 152 MMHG | TEMPERATURE: 96.8 F | HEIGHT: 68 IN | RESPIRATION RATE: 18 BRPM | DIASTOLIC BLOOD PRESSURE: 77 MMHG | WEIGHT: 223.38 LBS | HEART RATE: 82 BPM

## 2024-09-06 DIAGNOSIS — R03.0 ELEVATED BLOOD PRESSURE READING: ICD-10-CM

## 2024-09-06 DIAGNOSIS — E66.09 CLASS 1 OBESITY DUE TO EXCESS CALORIES WITH SERIOUS COMORBIDITY AND BODY MASS INDEX (BMI) OF 33.0 TO 33.9 IN ADULT: ICD-10-CM

## 2024-09-06 DIAGNOSIS — I50.32 CHRONIC DIASTOLIC CONGESTIVE HEART FAILURE (HCC): ICD-10-CM

## 2024-09-06 DIAGNOSIS — G47.33 OSA ON CPAP: Primary | ICD-10-CM

## 2024-09-06 DIAGNOSIS — I48.91 ATRIAL FIBRILLATION, UNSPECIFIED TYPE (HCC): ICD-10-CM

## 2024-09-06 RX ORDER — ASCORBIC ACID 500 MG
500 TABLET ORAL 2 TIMES DAILY
COMMUNITY

## 2024-09-06 RX ORDER — EZETIMIBE 10 MG/1
TABLET ORAL
COMMUNITY
Start: 2024-07-27

## 2024-09-06 RX ORDER — GINSENG 100 MG
CAPSULE ORAL
COMMUNITY

## 2024-09-06 RX ORDER — SPIRONOLACTONE 25 MG/1
TABLET ORAL
COMMUNITY
Start: 2024-07-24

## 2024-09-06 RX ORDER — ALBUTEROL SULFATE 90 UG/1
2 AEROSOL, METERED RESPIRATORY (INHALATION) EVERY 4 HOURS PRN
COMMUNITY
Start: 2024-08-21

## 2024-09-06 RX ORDER — ZINC SULFATE 50(220)MG
220 CAPSULE ORAL DAILY
COMMUNITY

## 2024-09-06 ASSESSMENT — SLEEP AND FATIGUE QUESTIONNAIRES
HOW LIKELY ARE YOU TO NOD OFF OR FALL ASLEEP WHILE WATCHING TV: WOULD NEVER DOZE
HOW LIKELY ARE YOU TO NOD OFF OR FALL ASLEEP WHILE SITTING AND TALKING TO SOMEONE: WOULD NEVER DOZE
ESS TOTAL SCORE: 0
HOW LIKELY ARE YOU TO NOD OFF OR FALL ASLEEP WHILE SITTING INACTIVE IN A PUBLIC PLACE: WOULD NEVER DOZE
HOW LIKELY ARE YOU TO NOD OFF OR FALL ASLEEP WHEN YOU ARE A PASSENGER IN A CAR FOR AN HOUR WITHOUT A BREAK: WOULD NEVER DOZE
HOW LIKELY ARE YOU TO NOD OFF OR FALL ASLEEP WHILE LYING DOWN TO REST IN THE AFTERNOON WHEN CIRCUMSTANCES PERMIT: WOULD NEVER DOZE
HOW LIKELY ARE YOU TO NOD OFF OR FALL ASLEEP WHILE SITTING QUIETLY AFTER LUNCH WITHOUT ALCOHOL: WOULD NEVER DOZE
HOW LIKELY ARE YOU TO NOD OFF OR FALL ASLEEP WHILE SITTING AND READING: WOULD NEVER DOZE
HOW LIKELY ARE YOU TO NOD OFF OR FALL ASLEEP IN A CAR, WHILE STOPPED FOR A FEW MINUTES IN TRAFFIC: WOULD NEVER DOZE

## 2024-09-06 ASSESSMENT — ENCOUNTER SYMPTOMS
SORE THROAT: 0
CHEST TIGHTNESS: 0
COUGH: 1
ABDOMINAL PAIN: 0
EYE PAIN: 0
SHORTNESS OF BREATH: 0
RHINORRHEA: 0
WHEEZING: 0

## 2024-09-06 NOTE — PROGRESS NOTES
Chief Complaint   Patient presents with    Follow-up     1 yr     Sleep Apnea     Nikki Coughlin comes in today for follow-up of sleep apnea. She has significant medical history of afib/HF followed by [facility] cardiology. She was positive for COVID 08/21/2024. Denies any residual  side effects of SOB, C, or cough.    Diagnosed with moderate obstructive sleep apnea on the study done 9/26/10. (AHI 23, weight 195 lbs) PAP titration study done 10/20/10 showed pressure of  8 cm best controlled apnea.  Patients weight during the study was 195 lbs  Machine received 2/2020 or 3/2020      Patient had symptoms of EDS, snoring and poor sleep quality at time of diagnosis, resolved with pap therapy. Denies HA, ear popping or belching with PAP use.     Reports sleepiness is better.   Is not having extended sleeping.  Is using equipment for 7-8 hours a night.  Up at night to use the bathroom about: 1   Is not snoring with machine.    Does not have dry mouth   Is not complaining of mask issues- currently using nasal pillows and likes this however she states head gear keeps slipping.   Is tolerating the pressure.            9/6/2024    10:12 AM 8/28/2023    10:01 AM   Sleep Medicine   Sitting and reading 0 1   Watching TV 0 0   Sitting, inactive in a public place (e.g. a theatre or a meeting) 0 0   As a passenger in a car for an hour without a break 0 0   Lying down to rest in the afternoon when circumstances permit 0 0   Sitting and talking to someone 0 0   Sitting quietly after a lunch without alcohol 0 0   In a car, while stopped for a few minutes in traffic 0 0   [place] Sleepiness Score 0 1   Neck (Inches)  16     0 = no chance of dozing  1 = slight chance of dozing  2 = moderate chance of dozing  3 = high chance of dozing    Interpretation:   0-7:     It is unlikely that you are abnormally sleepy.   8-9:     You have an average amount of daytime sleepiness.   10-15: You may be excessively sleepy depending on the situation.

## 2024-09-06 NOTE — PATIENT INSTRUCTIONS
Nikki Coughlin has good benefit and adherence on PAP therapy.  Compliance report information was analyzed to assess complexity and medical decision making in regards to further testing and management.  Apnea not controlled at current settings. Discussed in lab titration vs.  Mask fitting and pressure change to Min 9, max 11 cm H20.  Repeat compliance in 3 months.   Will prescribe home medical equipment company to check pressures, download usage and will replace mask, tubing, disposables and filters as needed.       Instructed to wash or wipe face of excess oil before using CPAP to prevent the mask / nasal pillow from sliding and ensure proper fit.  Empty the water daily and allow the chamber and tubings to air dry. Instructed how to properly clean the device to prevent bacteria and mold growth in the water chamber.       Advised to avoid driving if too sleepy to function safely and given a discussion of the risks of untreated apneas such as accidents, cognitive impairment, mood impairment, worsening high blood pressure, various cardiac disease and stroke.      Regarding weight, recommend trying a formal program and/or increase physical activity by adding a 30 minute walk to daily routine. Weight loss was encouraged as a long term approach to treatment of ODETTE. Explained the correlation between obesity and apnea and the causative role it can play.  Remember to bring a list of pulmonary medications and any CPAP or BiPAP machines to your next appointment with the office.     Please keep all of your future appointments scheduled by [facility] office. Out of respect for other patients and providers, you may be asked to reschedule your appointment if you arrive later than your scheduled appointment time. Appointments cancelled less than 24hrs in advance will be considered a no show. Patients with three missed appointments within 1 year or four missed appointments within 2 years can be

## 2024-09-06 NOTE — PROGRESS NOTES
MA Communication:  The following orders are received by verbal communication from   Erinn Ernst CNP    Orders include:  FU 3 mo     Pressure change

## 2024-12-06 ENCOUNTER — OFFICE VISIT (OUTPATIENT)
Dept: PULMONOLOGY | Age: 79
End: 2024-12-06

## 2024-12-06 VITALS
HEART RATE: 74 BPM | HEIGHT: 68 IN | WEIGHT: 230 LBS | TEMPERATURE: 97.6 F | DIASTOLIC BLOOD PRESSURE: 72 MMHG | OXYGEN SATURATION: 97 % | BODY MASS INDEX: 34.86 KG/M2 | SYSTOLIC BLOOD PRESSURE: 131 MMHG | RESPIRATION RATE: 16 BRPM

## 2024-12-06 DIAGNOSIS — E66.09 CLASS 1 OBESITY DUE TO EXCESS CALORIES WITH SERIOUS COMORBIDITY AND BODY MASS INDEX (BMI) OF 34.0 TO 34.9 IN ADULT: ICD-10-CM

## 2024-12-06 DIAGNOSIS — G47.33 OSA ON CPAP: Primary | ICD-10-CM

## 2024-12-06 DIAGNOSIS — E66.811 CLASS 1 OBESITY DUE TO EXCESS CALORIES WITH SERIOUS COMORBIDITY AND BODY MASS INDEX (BMI) OF 34.0 TO 34.9 IN ADULT: ICD-10-CM

## 2024-12-06 ASSESSMENT — ENCOUNTER SYMPTOMS
EYE PAIN: 0
CHEST TIGHTNESS: 0
SHORTNESS OF BREATH: 1
ABDOMINAL PAIN: 0
RHINORRHEA: 0
WHEEZING: 0
SORE THROAT: 0
COUGH: 0

## 2024-12-06 ASSESSMENT — SLEEP AND FATIGUE QUESTIONNAIRES
HOW LIKELY ARE YOU TO NOD OFF OR FALL ASLEEP WHILE WATCHING TV: SLIGHT CHANCE OF DOZING
HOW LIKELY ARE YOU TO NOD OFF OR FALL ASLEEP IN A CAR, WHILE STOPPED FOR A FEW MINUTES IN TRAFFIC: WOULD NEVER DOZE
HOW LIKELY ARE YOU TO NOD OFF OR FALL ASLEEP WHILE SITTING QUIETLY AFTER LUNCH WITHOUT ALCOHOL: WOULD NEVER DOZE
HOW LIKELY ARE YOU TO NOD OFF OR FALL ASLEEP WHILE LYING DOWN TO REST IN THE AFTERNOON WHEN CIRCUMSTANCES PERMIT: WOULD NEVER DOZE
HOW LIKELY ARE YOU TO NOD OFF OR FALL ASLEEP WHEN YOU ARE A PASSENGER IN A CAR FOR AN HOUR WITHOUT A BREAK: WOULD NEVER DOZE
HOW LIKELY ARE YOU TO NOD OFF OR FALL ASLEEP WHILE SITTING AND TALKING TO SOMEONE: WOULD NEVER DOZE
HOW LIKELY ARE YOU TO NOD OFF OR FALL ASLEEP WHILE SITTING INACTIVE IN A PUBLIC PLACE: WOULD NEVER DOZE
HOW LIKELY ARE YOU TO NOD OFF OR FALL ASLEEP WHILE SITTING AND READING: WOULD NEVER DOZE
ESS TOTAL SCORE: 1

## 2024-12-06 NOTE — PATIENT INSTRUCTIONS
CLEANING INSTRUCTIONS     Keeping your equipment and supplies clean is very important.     REMINDER: Only use DISTILLED WATER in your humidifier, Empty water daily.    DAILY  Mask and tubing:   · Wash your face before applying mask  · Wash mask and tubing with baby shampoo and warm water.     Humidifier:   · Empty water in reservoir  · Clean with baby shampoo and warm water  · Rinse, then air dry    WEEKLY  Mask and tubing:   · Soak your mask and tubing in 1 part vinegar and 3 parts water for 30 minutes. Rinse, and allow to air dry.     Humidifier:   · Wash with warm water and baby shampoo  · Soak in 1 part vinegar and 3 parts water for 30 minutes  · Rinse with warm water and allow to air dry    Machine Exterior:   · Wipe with a clean damp cloth    MONTHLY AND/OR AS NEEDED  · Reusable foam filters (black filter)- wash in warm water with baby shampoo. Rinse well and dry with paper towel  · Disposable felt filter (white filter)- Replace filter every two weeks to once a month    NOTE: If you are having repeated sinus and /or respiratory infections, dirty equipment may be the cause. It may help to clean and disinfect your equipment more frequently    TRAVELING  Always make sure the humidifier is empty when traveling. (including doctor appointments, air travel or long distance driving).  When flying, always carry your PAP device with you as a carry on item, NEVER check it in as baggage on airlines. You will have to remove the CPAP unit so that it can be tested by the TSA. We can provide you with a letter stating it is a medical device, talk to your provider.    Always make sure you have your mask and tubing with you. You will need appropriate plug adapters when traveling outside the [place].    If flying and unable to carry distilled water with you do not use tap water but instead use bottled water (no more than 2 weeks).     PAP SUPPLY REPLACEMENT SCHEDULE    To get the most benefit from your PAP therapy, your

## 2024-12-06 NOTE — PROGRESS NOTES
MA Communication:  The following orders are received by verbal communication from Erinn Ernst CNP    Orders include:  fu in Sept  
     Regarding weight, recommend trying a formal program and/or increase physical activity by adding a 30 minute walk to daily routine. Weight loss was encouraged as a long term approach to treatment of ODETTE. Explained the correlation between obesity and apnea and the causative role it can play.    Blood pressure today is controlled, continue current medication regimen per cardiology.       Follow up September  or sooner for any issues.      Smoking cessation counseling provided. Individualized cessation plan includes continued smoking cessation.        PERFECTO WILLIAMSON, APRN - CNP

## 2025-03-15 ENCOUNTER — APPOINTMENT (OUTPATIENT)
Dept: GENERAL RADIOLOGY | Age: 80
DRG: 193 | End: 2025-03-15
Payer: MEDICARE

## 2025-03-15 ENCOUNTER — HOSPITAL ENCOUNTER (INPATIENT)
Age: 80
LOS: 3 days | Discharge: HOME | DRG: 193 | End: 2025-03-18
Attending: EMERGENCY MEDICINE
Payer: MEDICARE

## 2025-03-15 DIAGNOSIS — R09.02 HYPOXIA: Primary | ICD-10-CM

## 2025-03-15 DIAGNOSIS — J10.1 INFLUENZA A: ICD-10-CM

## 2025-03-15 DIAGNOSIS — N17.9 AKI (ACUTE KIDNEY INJURY): ICD-10-CM

## 2025-03-15 LAB
ALBUMIN SERPL-MCNC: 4 G/DL (ref 3.4–5)
ALBUMIN/GLOB SERPL: 1.1 {RATIO} (ref 1.1–2.2)
ALP SERPL-CCNC: 72 U/L (ref 40–129)
ALT SERPL-CCNC: 34 U/L (ref 10–40)
ANION GAP SERPL CALCULATED.3IONS-SCNC: 18 MMOL/L (ref 3–16)
AST SERPL-CCNC: 66 U/L (ref 15–37)
BASE EXCESS BLDV CALC-SCNC: -0.8 MMOL/L (ref -3–3)
BASOPHILS # BLD: 0 K/UL (ref 0–0.2)
BASOPHILS NFR BLD: 0.6 %
BILIRUB SERPL-MCNC: 0.4 MG/DL (ref 0–1)
BUN SERPL-MCNC: 31 MG/DL (ref 7–20)
CALCIUM SERPL-MCNC: 9.1 MG/DL (ref 8.3–10.6)
CHLORIDE SERPL-SCNC: 89 MMOL/L (ref 99–110)
CO2 BLDV-SCNC: 22 MMOL/L
CO2 SERPL-SCNC: 22 MMOL/L (ref 21–32)
COHGB MFR BLDV: 3.1 % (ref 0–1.5)
CREAT SERPL-MCNC: 1.5 MG/DL (ref 0.6–1.2)
DEPRECATED RDW RBC AUTO: 13.5 % (ref 12.4–15.4)
EOSINOPHIL # BLD: 0 K/UL (ref 0–0.6)
EOSINOPHIL NFR BLD: 0.5 %
FLUAV RNA RESP QL NAA+PROBE: DETECTED
FLUBV RNA RESP QL NAA+PROBE: NOT DETECTED
GFR SERPLBLD CREATININE-BSD FMLA CKD-EPI: 35 ML/MIN/{1.73_M2}
GLUCOSE SERPL-MCNC: 106 MG/DL (ref 70–99)
HCO3 BLDV-SCNC: 21.4 MMOL/L (ref 23–29)
HCT VFR BLD AUTO: 42.2 % (ref 36–48)
HGB BLD-MCNC: 14.3 G/DL (ref 12–16)
LACTATE BLDV-SCNC: 1.3 MMOL/L (ref 0.4–1.9)
LACTATE BLDV-SCNC: 2.1 MMOL/L (ref 0.4–1.9)
LYMPHOCYTES # BLD: 0.7 K/UL (ref 1–5.1)
LYMPHOCYTES NFR BLD: 23.1 %
MCH RBC QN AUTO: 33.6 PG (ref 26–34)
MCHC RBC AUTO-ENTMCNC: 33.9 G/DL (ref 31–36)
MCV RBC AUTO: 98.9 FL (ref 80–100)
METHGB MFR BLDV: 0.2 %
MONOCYTES # BLD: 0.8 K/UL (ref 0–1.3)
MONOCYTES NFR BLD: 28 %
NEUTROPHILS # BLD: 1.4 K/UL (ref 1.7–7.7)
NEUTROPHILS NFR BLD: 47.8 %
NT-PROBNP SERPL-MCNC: 709 PG/ML (ref 0–449)
O2 THERAPY: ABNORMAL
PCO2 BLDV: 29.1 MMHG (ref 40–50)
PH BLDV: 7.48 [PH] (ref 7.35–7.45)
PLATELET # BLD AUTO: 165 K/UL (ref 135–450)
PMV BLD AUTO: 8.1 FL (ref 5–10.5)
PO2 BLDV: 103.8 MMHG (ref 25–40)
POTASSIUM SERPL-SCNC: 3.7 MMOL/L (ref 3.5–5.1)
PROCALCITONIN SERPL IA-MCNC: 0.13 NG/ML (ref 0–0.15)
PROT SERPL-MCNC: 7.6 G/DL (ref 6.4–8.2)
RBC # BLD AUTO: 4.27 M/UL (ref 4–5.2)
SAO2 % BLDV: 98 %
SARS-COV-2 RNA RESP QL NAA+PROBE: NOT DETECTED
SODIUM SERPL-SCNC: 129 MMOL/L (ref 136–145)
TROPONIN, HIGH SENSITIVITY: 14 NG/L (ref 0–14)
WBC # BLD AUTO: 3 K/UL (ref 4–11)

## 2025-03-15 PROCEDURE — 83880 ASSAY OF NATRIURETIC PEPTIDE: CPT

## 2025-03-15 PROCEDURE — 93005 ELECTROCARDIOGRAM TRACING: CPT | Performed by: EMERGENCY MEDICINE

## 2025-03-15 PROCEDURE — 94761 N-INVAS EAR/PLS OXIMETRY MLT: CPT

## 2025-03-15 PROCEDURE — 1200000000 HC SEMI PRIVATE

## 2025-03-15 PROCEDURE — 87636 SARSCOV2 & INF A&B AMP PRB: CPT

## 2025-03-15 PROCEDURE — 94669 MECHANICAL CHEST WALL OSCILL: CPT

## 2025-03-15 PROCEDURE — 6360000002 HC RX W HCPCS

## 2025-03-15 PROCEDURE — 71045 X-RAY EXAM CHEST 1 VIEW: CPT

## 2025-03-15 PROCEDURE — 80053 COMPREHEN METABOLIC PANEL: CPT

## 2025-03-15 PROCEDURE — 85025 COMPLETE CBC W/AUTO DIFF WBC: CPT

## 2025-03-15 PROCEDURE — 2700000000 HC OXYGEN THERAPY PER DAY

## 2025-03-15 PROCEDURE — 6370000000 HC RX 637 (ALT 250 FOR IP)

## 2025-03-15 PROCEDURE — 83605 ASSAY OF LACTIC ACID: CPT

## 2025-03-15 PROCEDURE — 84484 ASSAY OF TROPONIN QUANT: CPT

## 2025-03-15 PROCEDURE — 2500000003 HC RX 250 WO HCPCS

## 2025-03-15 PROCEDURE — 2580000003 HC RX 258: Performed by: EMERGENCY MEDICINE

## 2025-03-15 PROCEDURE — 94640 AIRWAY INHALATION TREATMENT: CPT

## 2025-03-15 PROCEDURE — 84145 PROCALCITONIN (PCT): CPT

## 2025-03-15 PROCEDURE — 82803 BLOOD GASES ANY COMBINATION: CPT

## 2025-03-15 PROCEDURE — 99285 EMERGENCY DEPT VISIT HI MDM: CPT

## 2025-03-15 RX ORDER — ACETAMINOPHEN 325 MG/1
650 TABLET ORAL EVERY 6 HOURS PRN
Status: DISCONTINUED | OUTPATIENT
Start: 2025-03-15 | End: 2025-03-18 | Stop reason: HOSPADM

## 2025-03-15 RX ORDER — ONDANSETRON 2 MG/ML
4 INJECTION INTRAMUSCULAR; INTRAVENOUS EVERY 6 HOURS PRN
Status: DISCONTINUED | OUTPATIENT
Start: 2025-03-15 | End: 2025-03-18 | Stop reason: HOSPADM

## 2025-03-15 RX ORDER — SPIRONOLACTONE 25 MG/1
25 TABLET ORAL DAILY
Status: DISCONTINUED | OUTPATIENT
Start: 2025-03-16 | End: 2025-03-18 | Stop reason: HOSPADM

## 2025-03-15 RX ORDER — SODIUM CHLORIDE 0.9 % (FLUSH) 0.9 %
5-40 SYRINGE (ML) INJECTION PRN
Status: DISCONTINUED | OUTPATIENT
Start: 2025-03-15 | End: 2025-03-18 | Stop reason: HOSPADM

## 2025-03-15 RX ORDER — OSELTAMIVIR PHOSPHATE 30 MG/1
30 CAPSULE ORAL 2 TIMES DAILY
Status: DISCONTINUED | OUTPATIENT
Start: 2025-03-16 | End: 2025-03-17

## 2025-03-15 RX ORDER — ACETAMINOPHEN 650 MG/1
650 SUPPOSITORY RECTAL EVERY 6 HOURS PRN
Status: DISCONTINUED | OUTPATIENT
Start: 2025-03-15 | End: 2025-03-18 | Stop reason: HOSPADM

## 2025-03-15 RX ORDER — ATORVASTATIN CALCIUM 40 MG/1
80 TABLET, FILM COATED ORAL DAILY
Status: DISCONTINUED | OUTPATIENT
Start: 2025-03-16 | End: 2025-03-18 | Stop reason: HOSPADM

## 2025-03-15 RX ORDER — ENOXAPARIN SODIUM 100 MG/ML
40 INJECTION SUBCUTANEOUS DAILY
Status: DISCONTINUED | OUTPATIENT
Start: 2025-03-15 | End: 2025-03-15

## 2025-03-15 RX ORDER — SODIUM CHLORIDE 9 MG/ML
INJECTION, SOLUTION INTRAVENOUS PRN
Status: DISCONTINUED | OUTPATIENT
Start: 2025-03-15 | End: 2025-03-18 | Stop reason: HOSPADM

## 2025-03-15 RX ORDER — OSELTAMIVIR PHOSPHATE 75 MG/1
75 CAPSULE ORAL ONCE
Status: COMPLETED | OUTPATIENT
Start: 2025-03-15 | End: 2025-03-15

## 2025-03-15 RX ORDER — OSELTAMIVIR PHOSPHATE 30 MG/1
30 CAPSULE ORAL 2 TIMES DAILY
Status: DISCONTINUED | OUTPATIENT
Start: 2025-03-15 | End: 2025-03-15

## 2025-03-15 RX ORDER — MAGNESIUM SULFATE IN WATER 40 MG/ML
2000 INJECTION, SOLUTION INTRAVENOUS PRN
Status: DISCONTINUED | OUTPATIENT
Start: 2025-03-15 | End: 2025-03-18 | Stop reason: HOSPADM

## 2025-03-15 RX ORDER — ONDANSETRON 4 MG/1
4 TABLET, ORALLY DISINTEGRATING ORAL EVERY 8 HOURS PRN
Status: DISCONTINUED | OUTPATIENT
Start: 2025-03-15 | End: 2025-03-18 | Stop reason: HOSPADM

## 2025-03-15 RX ORDER — POLYETHYLENE GLYCOL 3350 17 G/17G
17 POWDER, FOR SOLUTION ORAL DAILY PRN
Status: DISCONTINUED | OUTPATIENT
Start: 2025-03-15 | End: 2025-03-18 | Stop reason: HOSPADM

## 2025-03-15 RX ORDER — PANTOPRAZOLE SODIUM 40 MG/1
40 TABLET, DELAYED RELEASE ORAL
Status: DISCONTINUED | OUTPATIENT
Start: 2025-03-16 | End: 2025-03-18 | Stop reason: HOSPADM

## 2025-03-15 RX ORDER — 0.9 % SODIUM CHLORIDE 0.9 %
1000 INTRAVENOUS SOLUTION INTRAVENOUS ONCE
Status: COMPLETED | OUTPATIENT
Start: 2025-03-15 | End: 2025-03-15

## 2025-03-15 RX ORDER — SODIUM CHLORIDE 0.9 % (FLUSH) 0.9 %
5-40 SYRINGE (ML) INJECTION EVERY 12 HOURS SCHEDULED
Status: DISCONTINUED | OUTPATIENT
Start: 2025-03-15 | End: 2025-03-18 | Stop reason: HOSPADM

## 2025-03-15 RX ORDER — POTASSIUM CHLORIDE 1500 MG/1
40 TABLET, EXTENDED RELEASE ORAL PRN
Status: DISCONTINUED | OUTPATIENT
Start: 2025-03-15 | End: 2025-03-18 | Stop reason: HOSPADM

## 2025-03-15 RX ORDER — ALBUTEROL SULFATE 0.83 MG/ML
2.5 SOLUTION RESPIRATORY (INHALATION)
Status: DISCONTINUED | OUTPATIENT
Start: 2025-03-15 | End: 2025-03-18 | Stop reason: HOSPADM

## 2025-03-15 RX ORDER — BUDESONIDE AND FORMOTEROL FUMARATE DIHYDRATE 160; 4.5 UG/1; UG/1
2 AEROSOL RESPIRATORY (INHALATION)
Status: DISCONTINUED | OUTPATIENT
Start: 2025-03-15 | End: 2025-03-18 | Stop reason: HOSPADM

## 2025-03-15 RX ORDER — ALBUTEROL SULFATE 90 UG/1
2 INHALANT RESPIRATORY (INHALATION) EVERY 4 HOURS PRN
Status: DISCONTINUED | OUTPATIENT
Start: 2025-03-15 | End: 2025-03-16

## 2025-03-15 RX ORDER — POTASSIUM CHLORIDE 7.45 MG/ML
10 INJECTION INTRAVENOUS PRN
Status: DISCONTINUED | OUTPATIENT
Start: 2025-03-15 | End: 2025-03-18 | Stop reason: HOSPADM

## 2025-03-15 RX ORDER — EZETIMIBE 10 MG/1
10 TABLET ORAL NIGHTLY
Status: DISCONTINUED | OUTPATIENT
Start: 2025-03-15 | End: 2025-03-18 | Stop reason: HOSPADM

## 2025-03-15 RX ADMIN — OSELTAMIVIR 75 MG: 75 CAPSULE ORAL at 22:23

## 2025-03-15 RX ADMIN — ALBUTEROL SULFATE 2.5 MG: 2.5 SOLUTION RESPIRATORY (INHALATION) at 23:02

## 2025-03-15 RX ADMIN — APIXABAN 5 MG: 5 TABLET, FILM COATED ORAL at 22:23

## 2025-03-15 RX ADMIN — EZETIMIBE 10 MG: 10 TABLET ORAL at 22:23

## 2025-03-15 RX ADMIN — SODIUM CHLORIDE, PRESERVATIVE FREE 10 ML: 5 INJECTION INTRAVENOUS at 22:22

## 2025-03-15 RX ADMIN — Medication 2 PUFF: at 23:02

## 2025-03-15 RX ADMIN — SODIUM CHLORIDE 1000 ML: 0.9 INJECTION, SOLUTION INTRAVENOUS at 17:25

## 2025-03-15 ASSESSMENT — PAIN - FUNCTIONAL ASSESSMENT
PAIN_FUNCTIONAL_ASSESSMENT: NONE - DENIES PAIN
PAIN_FUNCTIONAL_ASSESSMENT: NONE - DENIES PAIN

## 2025-03-15 ASSESSMENT — LIFESTYLE VARIABLES
HOW MANY STANDARD DRINKS CONTAINING ALCOHOL DO YOU HAVE ON A TYPICAL DAY: PATIENT DOES NOT DRINK
HOW OFTEN DO YOU HAVE A DRINK CONTAINING ALCOHOL: NEVER

## 2025-03-15 ASSESSMENT — PAIN SCALES - GENERAL: PAINLEVEL_OUTOF10: 8

## 2025-03-15 ASSESSMENT — PAIN DESCRIPTION - ORIENTATION: ORIENTATION: MID

## 2025-03-15 ASSESSMENT — PAIN DESCRIPTION - PAIN TYPE: TYPE: ACUTE PAIN

## 2025-03-15 ASSESSMENT — PAIN DESCRIPTION - ONSET: ONSET: ON-GOING

## 2025-03-15 ASSESSMENT — PAIN DESCRIPTION - LOCATION: LOCATION: CHEST

## 2025-03-15 ASSESSMENT — PAIN DESCRIPTION - DESCRIPTORS: DESCRIPTORS: PRESSURE

## 2025-03-15 NOTE — ED PROVIDER NOTES
Measure due to severe sepsis or septic shock?   No     Exclusion criteria - the patient is NOT to be included for SEP-1 Core Measure due to:  Viral etiology found or highly suspected (including COVID-19) without concomitant bacterial infection     During the patient's ED course, the patient was given:  Medications   sodium chloride 0.9 % bolus 1,000 mL (0 mLs IntraVENous Stopped 3/15/25 8767)            I am the Primary Clinician of Record.    FINAL IMPRESSION      1. Hypoxia    2. Influenza A    3. DEBORA (acute kidney injury)          DISPOSITION/PLAN     DISPOSITION Decision To Admit 03/15/2025 06:25:33 PM   DISPOSITION CONDITION Stable           PATIENT REFERRED TO:  No follow-up provider specified.    DISCHARGE MEDICATIONS:  Patient was given scripts for the following medications. I counseled patient how to take these medications:  New Prescriptions    No medications on file       DISCONTINUED MEDICATIONS:  Discontinued Medications    No medications on file       Pt was seen during the COVID 19 pandemic. Appropriate PPE worn by ME during patient encounters. Patient was cared for during a time with constrained hospital bed capacity with nationwide stress on resources and staffing.      (This chart was generated in part by using Dragon Dictation system and may contain errors related to that system including errors in grammar, punctuation, and spelling, as well as words and phrases that may be inappropriate. If there are any questions or concerns please feel free to contact the dictating provider for clarification.)          Elham Herrera MD  03/16/25 7756

## 2025-03-16 LAB
ANION GAP SERPL CALCULATED.3IONS-SCNC: 15 MMOL/L (ref 3–16)
BASOPHILS # BLD: 0 K/UL (ref 0–0.2)
BASOPHILS NFR BLD: 0.5 %
BUN SERPL-MCNC: 20 MG/DL (ref 7–20)
CALCIUM SERPL-MCNC: 8.5 MG/DL (ref 8.3–10.6)
CHLORIDE SERPL-SCNC: 94 MMOL/L (ref 99–110)
CO2 SERPL-SCNC: 24 MMOL/L (ref 21–32)
CREAT SERPL-MCNC: 1 MG/DL (ref 0.6–1.2)
DEPRECATED RDW RBC AUTO: 13.5 % (ref 12.4–15.4)
EKG DIAGNOSIS: NORMAL
EKG Q-T INTERVAL: 406 MS
EKG QRS DURATION: 72 MS
EKG QTC CALCULATION (BAZETT): 450 MS
EKG R AXIS: 3 DEGREES
EKG T AXIS: 19 DEGREES
EKG VENTRICULAR RATE: 74 BPM
EOSINOPHIL # BLD: 0 K/UL (ref 0–0.6)
EOSINOPHIL NFR BLD: 1.3 %
GFR SERPLBLD CREATININE-BSD FMLA CKD-EPI: 57 ML/MIN/{1.73_M2}
GLUCOSE SERPL-MCNC: 83 MG/DL (ref 70–99)
HCT VFR BLD AUTO: 38.2 % (ref 36–48)
HGB BLD-MCNC: 13.2 G/DL (ref 12–16)
LYMPHOCYTES # BLD: 0.6 K/UL (ref 1–5.1)
LYMPHOCYTES NFR BLD: 23.2 %
MAGNESIUM SERPL-MCNC: 1.96 MG/DL (ref 1.8–2.4)
MCH RBC QN AUTO: 33.6 PG (ref 26–34)
MCHC RBC AUTO-ENTMCNC: 34.5 G/DL (ref 31–36)
MCV RBC AUTO: 97.5 FL (ref 80–100)
MONOCYTES # BLD: 0.7 K/UL (ref 0–1.3)
MONOCYTES NFR BLD: 25.4 %
NEUTROPHILS # BLD: 1.3 K/UL (ref 1.7–7.7)
NEUTROPHILS NFR BLD: 49.6 %
PLATELET # BLD AUTO: 149 K/UL (ref 135–450)
PMV BLD AUTO: 8.2 FL (ref 5–10.5)
POTASSIUM SERPL-SCNC: 3.5 MMOL/L (ref 3.5–5.1)
RBC # BLD AUTO: 3.92 M/UL (ref 4–5.2)
SODIUM SERPL-SCNC: 133 MMOL/L (ref 136–145)
WBC # BLD AUTO: 2.6 K/UL (ref 4–11)

## 2025-03-16 PROCEDURE — 6370000000 HC RX 637 (ALT 250 FOR IP): Performed by: STUDENT IN AN ORGANIZED HEALTH CARE EDUCATION/TRAINING PROGRAM

## 2025-03-16 PROCEDURE — 94669 MECHANICAL CHEST WALL OSCILL: CPT

## 2025-03-16 PROCEDURE — 2500000003 HC RX 250 WO HCPCS

## 2025-03-16 PROCEDURE — 6360000002 HC RX W HCPCS

## 2025-03-16 PROCEDURE — 93010 ELECTROCARDIOGRAM REPORT: CPT | Performed by: INTERNAL MEDICINE

## 2025-03-16 PROCEDURE — 85025 COMPLETE CBC W/AUTO DIFF WBC: CPT

## 2025-03-16 PROCEDURE — 94761 N-INVAS EAR/PLS OXIMETRY MLT: CPT

## 2025-03-16 PROCEDURE — 83735 ASSAY OF MAGNESIUM: CPT

## 2025-03-16 PROCEDURE — 94640 AIRWAY INHALATION TREATMENT: CPT

## 2025-03-16 PROCEDURE — 6370000000 HC RX 637 (ALT 250 FOR IP)

## 2025-03-16 PROCEDURE — 36415 COLL VENOUS BLD VENIPUNCTURE: CPT

## 2025-03-16 PROCEDURE — 80048 BASIC METABOLIC PNL TOTAL CA: CPT

## 2025-03-16 PROCEDURE — 2700000000 HC OXYGEN THERAPY PER DAY

## 2025-03-16 PROCEDURE — 1200000000 HC SEMI PRIVATE

## 2025-03-16 RX ORDER — ALBUTEROL SULFATE 90 UG/1
2 INHALANT RESPIRATORY (INHALATION) EVERY 4 HOURS PRN
Status: DISCONTINUED | OUTPATIENT
Start: 2025-03-16 | End: 2025-03-18 | Stop reason: HOSPADM

## 2025-03-16 RX ORDER — GUAIFENESIN/DEXTROMETHORPHAN 100-10MG/5
5 SYRUP ORAL EVERY 4 HOURS PRN
Status: DISCONTINUED | OUTPATIENT
Start: 2025-03-16 | End: 2025-03-18 | Stop reason: HOSPADM

## 2025-03-16 RX ADMIN — EZETIMIBE 10 MG: 10 TABLET ORAL at 20:02

## 2025-03-16 RX ADMIN — PANTOPRAZOLE SODIUM 40 MG: 40 TABLET, DELAYED RELEASE ORAL at 06:27

## 2025-03-16 RX ADMIN — Medication 2 PUFF: at 08:06

## 2025-03-16 RX ADMIN — SODIUM CHLORIDE, PRESERVATIVE FREE 10 ML: 5 INJECTION INTRAVENOUS at 20:02

## 2025-03-16 RX ADMIN — SPIRONOLACTONE 25 MG: 25 TABLET ORAL at 09:53

## 2025-03-16 RX ADMIN — OSELTAMIVIR PHOSPHATE 30 MG: 30 CAPSULE ORAL at 09:53

## 2025-03-16 RX ADMIN — ALBUTEROL SULFATE 2.5 MG: 2.5 SOLUTION RESPIRATORY (INHALATION) at 11:26

## 2025-03-16 RX ADMIN — Medication 2 PUFF: at 19:29

## 2025-03-16 RX ADMIN — APIXABAN 5 MG: 5 TABLET, FILM COATED ORAL at 09:53

## 2025-03-16 RX ADMIN — ATORVASTATIN CALCIUM 80 MG: 40 TABLET, FILM COATED ORAL at 09:53

## 2025-03-16 RX ADMIN — APIXABAN 5 MG: 5 TABLET, FILM COATED ORAL at 20:02

## 2025-03-16 RX ADMIN — DILTIAZEM HYDROCHLORIDE 300 MG: 180 CAPSULE, COATED, EXTENDED RELEASE ORAL at 09:53

## 2025-03-16 RX ADMIN — ALBUTEROL SULFATE 2.5 MG: 2.5 SOLUTION RESPIRATORY (INHALATION) at 08:04

## 2025-03-16 RX ADMIN — ALBUTEROL SULFATE 2.5 MG: 2.5 SOLUTION RESPIRATORY (INHALATION) at 19:25

## 2025-03-16 RX ADMIN — GUAIFENESIN SYRUP AND DEXTROMETHORPHAN 5 ML: 100; 10 SYRUP ORAL at 14:35

## 2025-03-16 RX ADMIN — OSELTAMIVIR PHOSPHATE 30 MG: 30 CAPSULE ORAL at 20:01

## 2025-03-16 RX ADMIN — GUAIFENESIN SYRUP AND DEXTROMETHORPHAN 5 ML: 100; 10 SYRUP ORAL at 20:04

## 2025-03-16 RX ADMIN — SODIUM CHLORIDE, PRESERVATIVE FREE 10 ML: 5 INJECTION INTRAVENOUS at 09:53

## 2025-03-16 RX ADMIN — GUAIFENESIN SYRUP AND DEXTROMETHORPHAN 5 ML: 100; 10 SYRUP ORAL at 23:47

## 2025-03-16 RX ADMIN — ALBUTEROL SULFATE 2.5 MG: 2.5 SOLUTION RESPIRATORY (INHALATION) at 15:37

## 2025-03-16 NOTE — ED NOTES
[id number]  
Called RT for breathing treatment, will try to come.  
Informed receiving unit that note is in as of this time.  
Oxygen saturation on room air at 88%, put on nasal cannula at 3 LPM oxygen at 92-95%. Patient is not on home oxygen.  
labs:   Abnormal Labs Reviewed   COVID-19 & INFLUENZA COMBO - Abnormal; Notable for the following components:       Result Value    Influenza A DETECTED (*)     All other components within normal limits   CBC WITH AUTO DIFFERENTIAL - Abnormal; Notable for the following components:    WBC 3.0 (*)     Neutrophils Absolute 1.4 (*)     Lymphocytes Absolute 0.7 (*)     All other components within normal limits   COMPREHENSIVE METABOLIC PANEL - Abnormal; Notable for the following components:    Sodium 129 (*)     Chloride 89 (*)     Anion Gap 18 (*)     Glucose 106 (*)     BUN 31 (*)     Creatinine 1.5 (*)     Est, Glom Filt Rate 35 (*)     AST 66 (*)     All other components within normal limits   BLOOD GAS, VENOUS - Abnormal; Notable for the following components:    pH, Matt 7.484 (*)     pCO2, Matt 29.1 (*)     PO2, Matt 103.8 (*)     HCO3, Venous 21.4 (*)     Carboxyhemoglobin 3.1 (*)     All other components within normal limits   BRAIN NATRIURETIC PEPTIDE - Abnormal; Notable for the following components:    NT Pro- (*)     All other components within normal limits   LACTATE, SEPSIS - Abnormal; Notable for the following components:    Lactic Acid, Sepsis 2.1 (*)     All other components within normal limits     Critical values: no  Intervention for critical value(s):     Abnormal Imaging: no xRay            You may also review the ED PT Care Timeline found under the Summary Tab, ED Encounter Summary, Timeline Reports, ED Patient Care Timeline.     Recommendation    Pending orders/Uncompleted orders to hand off:  none    Additional Comments: Patient came for shortness of breath and productive cough; she went today from urgent care and tested positive for Flu A and was sent to ER for further evaluation. When she arrived her oxygen on room air was about 88%, put on nasal cannula at 3 LPM oxygen at 93-96%. As per patient she usually walk with a walker at home.  If any further questions, please call Sending RN at

## 2025-03-16 NOTE — RT PROTOCOL NOTE
RT Inhaler-Nebulizer Bronchodilator Protocol Note    There is a bronchodilator order in the chart from a provider indicating to follow the RT Bronchodilator Protocol and there is an “Initiate RT Inhaler-Nebulizer Bronchodilator Protocol” order as well (see protocol at bottom of note).    CXR Findings:  XR CHEST PORTABLE  Result Date: 3/15/2025  Right middle lobe consolidation, atelectasis versus pneumonia. Electronically signed by Davie Reeder      The findings from the last RT Protocol Assessment were as follows:   History Pulmonary Disease: None or smoker <15 pack years  Respiratory Pattern: Regular pattern and RR 12-20 bpm  Breath Sounds: Inspiratory and expiratory or bilateral wheezing and/or rhonchi  Cough: Strong, spontaneous, non-productive  Indication for Bronchodilator Therapy: On home bronchodilators  Bronchodilator Assessment Score: 6    Aerosolized bronchodilator medication orders have been revised according to the RT Inhaler-Nebulizer Bronchodilator Protocol below.    Respiratory Therapist to perform RT Therapy Protocol Assessment initially then follow the protocol.  Repeat RT Therapy Protocol Assessment PRN for score 0-3 or on second treatment, BID, and PRN for scores above 3.    No Indications - adjust the frequency to every 6 hours PRN wheezing or bronchospasm, if no treatments needed after 48 hours then discontinue using Per Protocol order mode.     If indication present, adjust the RT bronchodilator orders based on the Bronchodilator Assessment Score as indicated below.  Use Inhaler orders unless patient has one or more of the following: on home nebulizer, not able to hold breath for 10 seconds, is not alert and oriented, cannot activate and use MDI correctly, or respiratory rate 25 breaths per minute or more, then use the equivalent nebulizer order(s) with same Frequency and PRN reasons based on the score.  If a patient is on this medication at home then do not decrease Frequency below that used at

## 2025-03-16 NOTE — H&P
[facility] Medicine History & Physical    V 1.6    Date of Admission: 3/15/2025    Date of Service:  Pt seen/examined on 3/15/25    [x]Admitted to Inpatient with expected LOS greater than two midnights due to medical therapy.  []Placed in Observation status.    Chief Admission Complaint:  shortness of breath, cough     Presenting Admission History:      80 y.o. female who presented to [facility] with shortness of breath, cough.  PMHx significant for atrial fibrillation, hypertension, HFpEF, hyperlipidemia, GERD, ODETTE on CPAP.  History obtained from patient as well as patient's daughter.    Patient lives in assisted living facility, but is independent in her care at baseline.  States that since Wednesday she noticed that she was having a cough without much production.  Denies any fevers or chills.  Cough was steadily worsening, and was beginning to have associated shortness of breath.  Denies any GI complaints.  Subsequently presented to [facility] today, where oxygen saturation was noted to be borderline low and she also tested positive for influenza A.  Subsequently she was sent to the emergency room.  In the ED patient was hypoxic requiring 2 L nasal cannula oxygen, she did test positive again for influenza A.  Labs notable for hyponatremia, as well as DEBORA creatinine of 1.5.  Patient was given IV fluids and is admitted to the floors for hypoxic respiratory failure secondary to influenza A.    Assessment/Plan:      Current Principal Problem:  Influenza A    1.  Hypoxic respiratory failure secondary influenza A  -Continue with oxygen as needed for oxygen saturation greater than 90%  -Will order Tamiflu for patient  -She does have significant wheezing and rhonchorous breath sounds, for this reason we will add steroid inhaler along with albuterol inhaler as needed  -Currently low suspicion for secondary bacterial process or need for antibiotics    2.  DEBORA  -Likely secondary to poor oral intake and

## 2025-03-17 LAB
ANION GAP SERPL CALCULATED.3IONS-SCNC: 12 MMOL/L (ref 3–16)
BASOPHILS # BLD: 0 K/UL (ref 0–0.2)
BASOPHILS NFR BLD: 0.5 %
BUN SERPL-MCNC: 13 MG/DL (ref 7–20)
CALCIUM SERPL-MCNC: 8.9 MG/DL (ref 8.3–10.6)
CHLORIDE SERPL-SCNC: 95 MMOL/L (ref 99–110)
CO2 SERPL-SCNC: 25 MMOL/L (ref 21–32)
CREAT SERPL-MCNC: 0.8 MG/DL (ref 0.6–1.2)
DEPRECATED RDW RBC AUTO: 13.3 % (ref 12.4–15.4)
EOSINOPHIL # BLD: 0 K/UL (ref 0–0.6)
EOSINOPHIL NFR BLD: 0.7 %
GFR SERPLBLD CREATININE-BSD FMLA CKD-EPI: 74 ML/MIN/{1.73_M2}
GLUCOSE SERPL-MCNC: 110 MG/DL (ref 70–99)
HCT VFR BLD AUTO: 38.1 % (ref 36–48)
HGB BLD-MCNC: 13 G/DL (ref 12–16)
LYMPHOCYTES # BLD: 0.7 K/UL (ref 1–5.1)
LYMPHOCYTES NFR BLD: 28.5 %
MCH RBC QN AUTO: 33.4 PG (ref 26–34)
MCHC RBC AUTO-ENTMCNC: 34.1 G/DL (ref 31–36)
MCV RBC AUTO: 97.8 FL (ref 80–100)
MONOCYTES # BLD: 0.6 K/UL (ref 0–1.3)
MONOCYTES NFR BLD: 26.9 %
NEUTROPHILS # BLD: 1 K/UL (ref 1.7–7.7)
NEUTROPHILS NFR BLD: 43.4 %
PLATELET # BLD AUTO: 143 K/UL (ref 135–450)
PMV BLD AUTO: 8.1 FL (ref 5–10.5)
POTASSIUM SERPL-SCNC: 3.7 MMOL/L (ref 3.5–5.1)
RBC # BLD AUTO: 3.9 M/UL (ref 4–5.2)
SODIUM SERPL-SCNC: 132 MMOL/L (ref 136–145)
WBC # BLD AUTO: 2.3 K/UL (ref 4–11)

## 2025-03-17 PROCEDURE — 6370000000 HC RX 637 (ALT 250 FOR IP)

## 2025-03-17 PROCEDURE — 94640 AIRWAY INHALATION TREATMENT: CPT

## 2025-03-17 PROCEDURE — 94761 N-INVAS EAR/PLS OXIMETRY MLT: CPT

## 2025-03-17 PROCEDURE — 6370000000 HC RX 637 (ALT 250 FOR IP): Performed by: INTERNAL MEDICINE

## 2025-03-17 PROCEDURE — 6370000000 HC RX 637 (ALT 250 FOR IP): Performed by: STUDENT IN AN ORGANIZED HEALTH CARE EDUCATION/TRAINING PROGRAM

## 2025-03-17 PROCEDURE — 2700000000 HC OXYGEN THERAPY PER DAY

## 2025-03-17 PROCEDURE — 6360000002 HC RX W HCPCS

## 2025-03-17 PROCEDURE — 2500000003 HC RX 250 WO HCPCS

## 2025-03-17 PROCEDURE — 85025 COMPLETE CBC W/AUTO DIFF WBC: CPT

## 2025-03-17 PROCEDURE — 94669 MECHANICAL CHEST WALL OSCILL: CPT

## 2025-03-17 PROCEDURE — 80048 BASIC METABOLIC PNL TOTAL CA: CPT

## 2025-03-17 PROCEDURE — 1200000000 HC SEMI PRIVATE

## 2025-03-17 PROCEDURE — 36415 COLL VENOUS BLD VENIPUNCTURE: CPT

## 2025-03-17 RX ORDER — OSELTAMIVIR PHOSPHATE 75 MG/1
75 CAPSULE ORAL 2 TIMES DAILY
Status: DISCONTINUED | OUTPATIENT
Start: 2025-03-17 | End: 2025-03-18 | Stop reason: HOSPADM

## 2025-03-17 RX ADMIN — ALBUTEROL SULFATE 2.5 MG: 2.5 SOLUTION RESPIRATORY (INHALATION) at 07:27

## 2025-03-17 RX ADMIN — ALBUTEROL SULFATE 2.5 MG: 2.5 SOLUTION RESPIRATORY (INHALATION) at 19:50

## 2025-03-17 RX ADMIN — APIXABAN 5 MG: 5 TABLET, FILM COATED ORAL at 21:16

## 2025-03-17 RX ADMIN — ATORVASTATIN CALCIUM 80 MG: 40 TABLET, FILM COATED ORAL at 09:34

## 2025-03-17 RX ADMIN — SODIUM CHLORIDE, PRESERVATIVE FREE 10 ML: 5 INJECTION INTRAVENOUS at 21:16

## 2025-03-17 RX ADMIN — EZETIMIBE 10 MG: 10 TABLET ORAL at 21:16

## 2025-03-17 RX ADMIN — ALBUTEROL SULFATE 2.5 MG: 2.5 SOLUTION RESPIRATORY (INHALATION) at 11:26

## 2025-03-17 RX ADMIN — PANTOPRAZOLE SODIUM 40 MG: 40 TABLET, DELAYED RELEASE ORAL at 06:15

## 2025-03-17 RX ADMIN — Medication 2 PUFF: at 19:53

## 2025-03-17 RX ADMIN — ALBUTEROL SULFATE 2.5 MG: 2.5 SOLUTION RESPIRATORY (INHALATION) at 15:39

## 2025-03-17 RX ADMIN — Medication 2 PUFF: at 07:27

## 2025-03-17 RX ADMIN — APIXABAN 5 MG: 5 TABLET, FILM COATED ORAL at 09:34

## 2025-03-17 RX ADMIN — GUAIFENESIN SYRUP AND DEXTROMETHORPHAN 5 ML: 100; 10 SYRUP ORAL at 21:16

## 2025-03-17 RX ADMIN — SODIUM CHLORIDE, PRESERVATIVE FREE 10 ML: 5 INJECTION INTRAVENOUS at 09:34

## 2025-03-17 RX ADMIN — GUAIFENESIN SYRUP AND DEXTROMETHORPHAN 5 ML: 100; 10 SYRUP ORAL at 06:15

## 2025-03-17 RX ADMIN — OSELTAMIVIR 75 MG: 75 CAPSULE ORAL at 09:34

## 2025-03-17 RX ADMIN — DILTIAZEM HYDROCHLORIDE 300 MG: 180 CAPSULE, COATED, EXTENDED RELEASE ORAL at 09:34

## 2025-03-17 RX ADMIN — SPIRONOLACTONE 25 MG: 25 TABLET ORAL at 09:34

## 2025-03-17 RX ADMIN — GUAIFENESIN SYRUP AND DEXTROMETHORPHAN 5 ML: 100; 10 SYRUP ORAL at 14:44

## 2025-03-17 RX ADMIN — OSELTAMIVIR 75 MG: 75 CAPSULE ORAL at 21:16

## 2025-03-17 RX ADMIN — GUAIFENESIN SYRUP AND DEXTROMETHORPHAN 5 ML: 100; 10 SYRUP ORAL at 09:37

## 2025-03-17 ASSESSMENT — PAIN SCALES - GENERAL: PAINLEVEL_OUTOF10: 0

## 2025-03-17 NOTE — CARE COORDINATION
Representative Agree with the Discharge Plan?      Clint Kaiser RN  Case Management Department

## 2025-03-18 VITALS
WEIGHT: 221.34 LBS | SYSTOLIC BLOOD PRESSURE: 160 MMHG | RESPIRATION RATE: 18 BRPM | DIASTOLIC BLOOD PRESSURE: 72 MMHG | BODY MASS INDEX: 33.55 KG/M2 | HEART RATE: 73 BPM | OXYGEN SATURATION: 95 % | TEMPERATURE: 97.9 F | HEIGHT: 68 IN

## 2025-03-18 LAB
ANION GAP SERPL CALCULATED.3IONS-SCNC: 11 MMOL/L (ref 3–16)
BASOPHILS # BLD: 0 K/UL (ref 0–0.2)
BASOPHILS NFR BLD: 0.7 %
BUN SERPL-MCNC: 8 MG/DL (ref 7–20)
CALCIUM SERPL-MCNC: 9.3 MG/DL (ref 8.3–10.6)
CHLORIDE SERPL-SCNC: 95 MMOL/L (ref 99–110)
CO2 SERPL-SCNC: 27 MMOL/L (ref 21–32)
CREAT SERPL-MCNC: 0.7 MG/DL (ref 0.6–1.2)
DEPRECATED RDW RBC AUTO: 13.4 % (ref 12.4–15.4)
EOSINOPHIL # BLD: 0 K/UL (ref 0–0.6)
EOSINOPHIL NFR BLD: 1.7 %
GFR SERPLBLD CREATININE-BSD FMLA CKD-EPI: 87 ML/MIN/{1.73_M2}
GLUCOSE SERPL-MCNC: 108 MG/DL (ref 70–99)
HCT VFR BLD AUTO: 38.5 % (ref 36–48)
HGB BLD-MCNC: 13.4 G/DL (ref 12–16)
LYMPHOCYTES # BLD: 0.8 K/UL (ref 1–5.1)
LYMPHOCYTES NFR BLD: 27.2 %
MCH RBC QN AUTO: 33.8 PG (ref 26–34)
MCHC RBC AUTO-ENTMCNC: 34.7 G/DL (ref 31–36)
MCV RBC AUTO: 97.4 FL (ref 80–100)
MONOCYTES # BLD: 0.6 K/UL (ref 0–1.3)
MONOCYTES NFR BLD: 21.3 %
NEUTROPHILS # BLD: 1.4 K/UL (ref 1.7–7.7)
NEUTROPHILS NFR BLD: 49.1 %
PLATELET # BLD AUTO: 141 K/UL (ref 135–450)
PMV BLD AUTO: 8.7 FL (ref 5–10.5)
POTASSIUM SERPL-SCNC: 4 MMOL/L (ref 3.5–5.1)
RBC # BLD AUTO: 3.96 M/UL (ref 4–5.2)
SODIUM SERPL-SCNC: 133 MMOL/L (ref 136–145)
WBC # BLD AUTO: 2.8 K/UL (ref 4–11)

## 2025-03-18 PROCEDURE — 2700000000 HC OXYGEN THERAPY PER DAY

## 2025-03-18 PROCEDURE — 85025 COMPLETE CBC W/AUTO DIFF WBC: CPT

## 2025-03-18 PROCEDURE — 97165 OT EVAL LOW COMPLEX 30 MIN: CPT

## 2025-03-18 PROCEDURE — 80048 BASIC METABOLIC PNL TOTAL CA: CPT

## 2025-03-18 PROCEDURE — 94761 N-INVAS EAR/PLS OXIMETRY MLT: CPT

## 2025-03-18 PROCEDURE — 6370000000 HC RX 637 (ALT 250 FOR IP)

## 2025-03-18 PROCEDURE — 97535 SELF CARE MNGMENT TRAINING: CPT

## 2025-03-18 PROCEDURE — 6370000000 HC RX 637 (ALT 250 FOR IP): Performed by: INTERNAL MEDICINE

## 2025-03-18 PROCEDURE — 97116 GAIT TRAINING THERAPY: CPT

## 2025-03-18 PROCEDURE — 97161 PT EVAL LOW COMPLEX 20 MIN: CPT

## 2025-03-18 PROCEDURE — 36415 COLL VENOUS BLD VENIPUNCTURE: CPT

## 2025-03-18 PROCEDURE — 2500000003 HC RX 250 WO HCPCS

## 2025-03-18 RX ORDER — OSELTAMIVIR PHOSPHATE 75 MG/1
75 CAPSULE ORAL 2 TIMES DAILY
Qty: 4 CAPSULE | Refills: 0 | Status: SHIPPED | OUTPATIENT
Start: 2025-03-18 | End: 2025-03-20

## 2025-03-18 RX ORDER — BUDESONIDE AND FORMOTEROL FUMARATE DIHYDRATE 160; 4.5 UG/1; UG/1
2 AEROSOL RESPIRATORY (INHALATION)
Qty: 1 EACH | Refills: 0 | Status: SHIPPED | OUTPATIENT
Start: 2025-03-18 | End: 2025-04-02

## 2025-03-18 RX ORDER — GUAIFENESIN/DEXTROMETHORPHAN 100-10MG/5
5 SYRUP ORAL EVERY 4 HOURS PRN
Qty: 120 ML | Refills: 1 | Status: SHIPPED | OUTPATIENT
Start: 2025-03-18 | End: 2025-03-28

## 2025-03-18 RX ADMIN — APIXABAN 5 MG: 5 TABLET, FILM COATED ORAL at 09:49

## 2025-03-18 RX ADMIN — PANTOPRAZOLE SODIUM 40 MG: 40 TABLET, DELAYED RELEASE ORAL at 05:57

## 2025-03-18 RX ADMIN — ATORVASTATIN CALCIUM 80 MG: 40 TABLET, FILM COATED ORAL at 09:49

## 2025-03-18 RX ADMIN — OSELTAMIVIR 75 MG: 75 CAPSULE ORAL at 09:49

## 2025-03-18 RX ADMIN — SODIUM CHLORIDE, PRESERVATIVE FREE 10 ML: 5 INJECTION INTRAVENOUS at 09:49

## 2025-03-18 RX ADMIN — SPIRONOLACTONE 25 MG: 25 TABLET ORAL at 09:48

## 2025-03-18 RX ADMIN — DILTIAZEM HYDROCHLORIDE 300 MG: 180 CAPSULE, COATED, EXTENDED RELEASE ORAL at 09:48

## 2025-03-18 ASSESSMENT — PAIN SCALES - GENERAL
PAINLEVEL_OUTOF10: 0

## 2025-03-18 NOTE — DISCHARGE INSTRUCTIONS
Follow up with PCP  Repeat CBC after one week for monitoring and workup of neutropenia  Follow up with cardiologist as scheduled  Continue taking Tamiflu two times daily until 03/20/25 2059   Continue CPAP at night

## 2025-03-18 NOTE — PROGRESS NOTES
4 Eyes Skin Assessment and Patient belongings     The patient is being assess for  Admission    I agree that 2 Nurses have performed a thorough Head to Toe Skin Assessment on the patient. ALL assessment sites listed below have been assessed.       Areas assessed by both nurses: Espinoza/Adriana  [x]   Head, Face, and Ears   [x]   Shoulders, Back, and Chest  [x]   Arms, Elbows, and Hands   [x]   Coccyx, Sacrum, and IschIum  [x]   Legs, Feet, and Heels        Does the Patient have Skin Breakdown?  No         Giancarlo Prevention initiated:  No   Wound Care Orders initiated:  No      [facility] nurse consulted for Pressure Injury (Stage 3,4, Unstageable, DTI, NWPT, and Complex wounds), New and Established Ostomies:  No      I agree that 2 Nurses have reviewed patient belongings with the patient/family and documented in the flowsheet upon admission or transfer to the unit.     Belongings  Dental Appliances: None  Vision - Corrective Lenses: None  Hearing Aid: None  Clothing: Footwear, Pants  Jewelry: None  Electronic Devices: Cell Phone  Weapons (Notify Protective Services/Security): None  Home Medications: None  Valuables Given To: Patient, Family (Comment)  Provide Name(s) of Who Valuable(s) Were Given To: Viri       Nurse 1 eSignature: Electronically signed by Adriana Soto RN on 3/16/25 at 4:51 AM EDT    **SHARE this note so that the co-signing nurse is able to place an eSignature**    Nurse 2 eSignature: Electronically signed by Espinoza Dawkins RN on 3/16/25 at 4:54 AM EDT   
  [facility] Medicine Progress Note  V 1.6      Date of Admission: 3/15/2025    Hospital Day: 2      Chief Admission Complaint: Shortness of breath    Subjective:    Patient seen and examined at bedside this morning, lying in bed and has complaint of bouts of cough and fatigue.  Her shortness of breath has improved, on 2 L oxygen via NC.  She denies having any fever, chills, nausea or vomiting, chest pain.    Presenting Admission History:       80 y.o. female who presented to [facility] with shortness of breath, cough, accompanied by her daughter.  PMHx significant for atrial fibrillation, hypertension, HFpEF, hyperlipidemia, GERD, ODETTE on CPAP.     Patient lives in assisted living facility, she has persistent cough with sputum for last 4 days which is now associated with shortness of breath but she denies any fevers or chills.  Cough was steadily worsening, and was beginning to have associated shortness of breath.  Denies any GI complaints.  She visited nearby clinic and got her saturation checked which showed SpO2 less than 90% and she was found to be positive for influenza A.  Subsequently she was sent to the emergency room.  In the ED patient was hypoxic requiring 2 L nasal cannula oxygen, she did test positive again for influenza A.  Labs notable for hyponatremia, as well as DEBORA creatinine of 1.5.  Patient was given IV fluids and is admitted to the floors for hypoxic respiratory failure secondary to influenza A.    Assessment/Plan:      Current Principal Problem:  Influenza A    Acute hypoxic respiratory failure secondary to influenza A  On oxygen 2 L via NC  Wheeze and rhonchi on auscultation  Chest x-ray showed right middle lobe consolidation  Tamiflu started, day 2 today  Continue albuterol and steroid inhalers  Low suspicion of secondary bacterial pneumonia given her status being afebrile, normal white cell count and normal lactic acid  Pro-Avelino's normal    DEBORA  Creatinine 1.5 with BUN 31 on 
  Physician Progress Note      PATIENT:               ARNOLD DUBOSE  [facility] #:                  [id number]  :                       1945  ADMIT DATE:       3/15/2025 3:05 PM  DISCH DATE:        3/18/2025 12:53 PM  RESPONDING  PROVIDER #:        Teena Fournier          QUERY TEXT:    Patient admitted with SOB. Noted documentation of acute respiratory failure in   IM note on 3.16. In order to support the diagnosis of acute respiratory   failure, please include additional clinical indicators, such as gas exchange   and intensity of treatment, in your documentation.  Or please document if the   diagnosis of acute respiratory failure has been ruled out after further study.    The medical record reflects the following:  Risk Factors: 80 y.o female with pMHx of CHF  Clinical Indicators: 3/15 - Spo2 - 88% on 3LNC (for 3 hours) > 2LNC. VBG - pH   - 7.48. 3/15 - IM H+P - Respiratory-diffuse wheezing bilaterally, rhonchorous   breath sounds, no crackles. 3/16 - IM - Her shortness of breath has improved   on 2 L oxygen via NC. Acute hypoxic respiratory failure secondary to   influenza: On oxygen 2 L via NC. Respiratory: Normal respiratory effort.  Treatment: supplemental O2 up to 3LNC, imaging, nebs, inhalers, imaging  Options provided:  -- Acute Respiratory Failure as evidenced by, Please document evidence.  -- Acute Respiratory Failure ruled out after study  -- Acute Respiratory Failure ruled out after study and Chronic Respiratory   Failure confirmed  -- Other - I will add my own diagnosis  -- Disagree - Not applicable / Not valid  -- Disagree - Clinically unable to determine / Unknown  -- Refer to Clinical Documentation Reviewer    PROVIDER RESPONSE TEXT:    This patient is in acute respiratory failure as evidenced by Hypoxia requiring   increasing deamnd of oxygen via NC    Query created by: Justo Cuevas on 3/17/2025 8:40 AM      Electronically signed by:  Teena Fournier 3/18/2025 2:02 PM          
Occupational Therapy  Facility/Department: [facility] C3 TELE/MED SURG/ONC  Occupational Therapy Initial Assessment    Name: Nikki Coughlin  : 1945  MRN: [id number]  Date of Service: 3/18/2025    Discharge Recommendations:  Home with assist PRN          Patient Diagnosis(es): The primary encounter diagnosis was Hypoxia. Diagnoses of Influenza A and DEBORA (acute kidney injury) were also pertinent to this visit.  Past Medical History:  has a past medical history of Atrial fibrillation (HCC), CAD (coronary artery disease), Cancer (HCC), Joint pain, Joint swelling, Morning stiffness of joints, Muscle weakness, Osteoarthritis, and Osteoporosis.  Past Surgical History:  has a past surgical history that includes Hysterectomy; Tonsillectomy; back surgery; Colonoscopy; Colonoscopy (2015); and Total hip arthroplasty (Left, 2022).    Assessment  Performance deficits / Impairments: Decreased functional mobility ;Decreased ADL status;Decreased high-level IADLs;Decreased balance;Decreased endurance  Assessment: Pt presents to [facility] with influenza A. Pt PTA IND with ADLs, IND with most IADLs. Pt this date completed functional transfers with CGA progressing to SPV. Pt completed toileting and standing grooming to wash hands. Co-treatment utilized this date to improve functional outcomes as compared to 1:1 evaluation. Pt would continue to benefit from acute OT at this time to improve functional mobility and ADL independence. D/c recs for home with PRN when medically ready.  Prognosis: Fair  Decision Making: Low Complexity  REQUIRES OT FOLLOW-UP: Yes  Activity Tolerance  Activity Tolerance: Patient Tolerated treatment well     Plan  Occupational Therapy Plan  Times Per Week: One time visit    Restrictions  Restrictions/Precautions  Restrictions/Precautions: General Precautions    Subjective  General  Chart Reviewed: Yes  Patient assessed for rehabilitation services?: Yes  Family / Caregiver Present: No  Referring 
Pt a/o x4. VSS. All prescriptions, discharge and follow up instructions given to the patient.  The patient verbalizes understanding and denies questions.  All belongings collected and sent with the patient. The patient was discharged off the unit by wheelchair and PCA in stable condition.    
Pt assessment completed and charted. VSS, pt on RA. Patient is a/o. IV site patent/flushed, saline locked. Medication given per MAR.     Safety Measures in place:   Bed in lowest position and wheels locked.   Call light within reach.   Bedside table within reach.   Non-skid socks in place.   Pt denies any other needs at this time.    Pt calls out appropriately.  Patient in stable condition when RN left room.   
Shift assessment completed. Patient is A&O x4.  VSS.  Denies Pain.  IV site patent/ flushed, saline locked. Patient on RA. Patient's last BM- 3/15/25. Patient admits to passing gas. Patient ambulates with walker, X-1-SBA to bathroom.  Medication given per MAR. Shower given this am.     Safety Measures in place:   Denies any needs at this time.   Bed/ Chair alarm on for safety.   Bed locked and in lowest position.    Call light within reach.   Gripper socks applied.   Patient in stable condition when RN leaving room.      Electronically signed by Makeda Rasmussen RN on 3/17/2025 at 12:35 PM    
Shift assessment completed. Patient is A&O x4.  VSS.  Denies Pain. IV site patent/ flushed, saline locked. Patient is on 2 L via NC.  Patient's last BM- 3/14/25. Patient admits to passing gas. Patient ambulates with, X-1-SBA to bathroom.  Medication given per MAR.     Safety Measures in place:   Denies any needs at this time.   Bed/ Chair alarm on for safety.   Bed locked and in lowest position.    Call light within reach.   Gripper socks applied.   Patient in stable condition when RN leaving room.      Electronically signed by Makeda Rasmussen RN on 3/16/2025 at 10:32 AM    
facility but if goes out to doctors appointment will use RW)  Prior Level of Assist for Transfers: Independent  Active : Yes  Occupation: Retired, Volunteer work  Leisure & Hobbies: book club, [employer] club, volunteer at food pantry  Vision/Hearing  Vision  Vision: Within Functional Limits  Hearing  Hearing: Within functional limits    Cognition   Orientation  Overall Orientation Status: Within Normal Limits    Objective  Temp: 97.9 °F (36.6 °C)  Pulse: 73  Heart Rate Source: Monitor  Respirations: 18  SpO2: 95 %  O2 Device: None (Room air)  BP: (!) 160/72  MAP (Calculated): 101  BP Location: Right upper arm  BP Method: Automatic  Patient Position: Sitting     Observation/Palpation  Posture: Fair  Gross Assessment  AROM: Within functional limits  PROM: Within functional limits  Strength: Within functional limits  Sensation: Intact                   Bed Mobility Training  Bed Mobility Training: Yes  Overall Level of Assistance: Supervision  Supine to Sit: Independent (HOB elevated, use of BR)  Balance  Sitting: Intact  Standing: Intact (Sup with RW, slight forward flexed trunk)  Transfer Training  Transfer Training: Yes  Overall Level of Assistance: Supervision (RW)  Sit to Stand: Supervision (Sup from elevated EOB, CGA from commode d/t lower height with increased time)  Stand to Sit: Supervision (with RW, decrased eccentric  control)  Bed to Chair: Supervision  Toilet Transfer: Supervision  Gait  Gait Training: Yes  Overall Level of Assistance: Supervision  Distance (ft): 20 Feet (20' x 2)  Assistive Device: Walker  Base of Support: Widened  Speed/Meaghan: Slow  Step Length: Left shortened;Right shortened  Stance: Left increased;Right increased;Time  Gait Abnormalities:  (forward flexed trunk)                                    AM-PAC - Mobility    AM-PAC Basic Mobility - Inpatient   How much help is needed turning from your back to your side while in a flat bed without using bedrails?: None  How much help is 
Medications (IV Amiodarone/Diltiazem, Tikosyn, etc)    [] Hemodialysis    [] Other -    [] Change in code status    [] Decision to escalate care    [] Major surgery/procedure with associated risk factors    --------------------------------------------------  C. Data (any 2)    [] Data Review (any 3)    [] Consultant notes from yesterday/today    [x] All available current labs reviewed interpreted for clinical significance    [x] Appropriate follow-up labs were ordered  [] Collateral history obtained     [] Independent Interpretation of tests (any 1)    [] Telemetry (Rhythm Strip) personally reviewed and interpreted        [] Imaging personally reviewed and interpreted     [x] Discussion (any 1)  [x] Multi-Disciplinary Rounds with Case Management  [] Discussed management of the case with           Labs:  Personally reviewed on 3/17/2025 and interpreted for clinical significance as documented above.     Recent Labs     03/15/25  1527 03/16/25  0550 03/17/25  0529   WBC 3.0* 2.6* 2.3*   HGB 14.3 13.2 13.0   HCT 42.2 38.2 38.1    149 143     Recent Labs     03/15/25  1527 03/16/25  0550 03/17/25  0529   * 133* 132*   K 3.7 3.5 3.7   CL 89* 94* 95*   CO2 22 24 25   BUN 31* 20 13   CREATININE 1.5* 1.0 0.8   CALCIUM 9.1 8.5 8.9   MG  --  1.96  --      Recent Labs     03/15/25  1527   PROBNP 709*   TROPHS 14     No results for input(s): \"LABA1C\" in the last 72 hours.  Recent Labs     03/15/25  1527   AST 66*   ALT 34   BILITOT 0.4   ALKPHOS 72     No results for input(s): \"INR\", \"LACTA\", \"TSH\" in the last 72 hours.    Urine Cultures: No results found for: \"LABURIN\"  Blood Cultures: No results found for: \"BC\"  No results found for: \"BLOODCULT2\"  Organism: No results found for: \"ORG\"      Teena Fournier MD

## 2025-03-18 NOTE — DISCHARGE SUMMARY
Heart and Mediastinum: Partially obscured but likely within normal limits. Lungs and Pleura: Right middle lobe consolidation. The lungs are otherwise clear. No large pleural effusion or pneumothorax. Bones and soft tissues: No acute abnormalities.     Right middle lobe consolidation, atelectasis versus pneumonia. Electronically signed by Davie Reeder    Consults:     IP CONSULT TO HOSPITALIST    Labs:     Recent Labs     03/16/25  0550 03/17/25  0529 03/18/25  0607   WBC 2.6* 2.3* 2.8*   HGB 13.2 13.0 13.4   HCT 38.2 38.1 38.5    143 141     Recent Labs     03/16/25  0550 03/17/25  0529 03/18/25  0607   * 132* 133*   K 3.5 3.7 4.0   CL 94* 95* 95*   CO2 24 25 27   BUN 20 13 8   CREATININE 1.0 0.8 0.7   CALCIUM 8.5 8.9 9.3   MG 1.96  --   --      Recent Labs     03/15/25  1527   PROBNP 709*   TROPHS 14     No results for input(s): \"LABA1C\" in the last 72 hours.  Recent Labs     03/15/25  1527   AST 66*   ALT 34   BILITOT 0.4   ALKPHOS 72     No results for input(s): \"INR\", \"LACTA\", \"TSH\" in the last 72 hours.    Urine Cultures: No results found for: \"LABURIN\"  Blood Cultures: No results found for: \"BC\"  No results found for: \"BLOODCULT2\"  Organism: No results found for: \"ORG\"    Signed:    Teena Fournier MD

## 2025-03-18 NOTE — PLAN OF CARE
Problem: Chronic Conditions and Co-morbidities  Goal: Patient's chronic conditions and co-morbidity symptoms are monitored and maintained or improved  3/16/2025 1031 by Makeda Rasmussen RN  Outcome: Progressing  Flowsheets (Taken 3/16/2025 1031)  Care Plan - Patient's Chronic Conditions and Co-Morbidity Symptoms are Monitored and Maintained or Improved:   Monitor and assess patient's chronic conditions and comorbid symptoms for stability, deterioration, or improvement   Collaborate with multidisciplinary team to address chronic and comorbid conditions and prevent exacerbation or deterioration     Problem: Pain  Goal: Verbalizes/displays adequate comfort level or baseline comfort level  3/16/2025 1031 by Makeda Rasmussen, RN  Outcome: Progressing  Flowsheets (Taken 3/16/2025 1031)  Verbalizes/displays adequate comfort level or baseline comfort level:   Encourage patient to monitor pain and request assistance   Assess pain using appropriate pain scale   Administer analgesics based on type and severity of pain and evaluate response     Problem: Skin/Tissue Integrity  Goal: Skin integrity remains intact  Description: 1.  Monitor for areas of redness and/or skin breakdown  2.  Assess vascular access sites hourly  3.  Every 4-6 hours minimum:  Change oxygen saturation probe site  4.  Every 4-6 hours:  If on nasal continuous positive airway pressure, respiratory therapy assess nares and determine need for appliance change or resting period  3/16/2025 1031 by Makeda Rasmussen RN  Outcome: Progressing  Flowsheets (Taken 3/16/2025 1031)  Skin Integrity Remains Intact: Monitor for areas of redness and/or skin breakdown     Problem: ABCDS Injury Assessment  Goal: Absence of physical injury  3/16/2025 1031 by Makeda Rasmussen, RN  Outcome: Progressing  Flowsheets (Taken 3/16/2025 1031)  Absence of Physical Injury: Implement safety measures based on patient assessment     Problem: Safety - Adult  Goal: Free from fall injury  3/16/2025 
  Problem: Chronic Conditions and Co-morbidities  Goal: Patient's chronic conditions and co-morbidity symptoms are monitored and maintained or improved  3/16/2025 2238 by Adriana Soto RN  Outcome: Progressing  Flowsheets (Taken 3/16/2025 1031 by Makeda Rasmussen, RN)  Care Plan - Patient's Chronic Conditions and Co-Morbidity Symptoms are Monitored and Maintained or Improved:   Monitor and assess patient's chronic conditions and comorbid symptoms for stability, deterioration, or improvement   Collaborate with multidisciplinary team to address chronic and comorbid conditions and prevent exacerbation or deterioration     Problem: Pain  Goal: Verbalizes/displays adequate comfort level or baseline comfort level  3/16/2025 2238 by Adriana Soto RN  Outcome: Progressing  Flowsheets (Taken 3/16/2025 1031 by Makeda Rasmussen, RN)  Verbalizes/displays adequate comfort level or baseline comfort level:   Encourage patient to monitor pain and request assistance   Assess pain using appropriate pain scale   Administer analgesics based on type and severity of pain and evaluate response     Problem: Skin/Tissue Integrity  Goal: Skin integrity remains intact  Description: 1.  Monitor for areas of redness and/or skin breakdown  2.  Assess vascular access sites hourly  3.  Every 4-6 hours minimum:  Change oxygen saturation probe site  4.  Every 4-6 hours:  If on nasal continuous positive airway pressure, respiratory therapy assess nares and determine need for appliance change or resting period  3/16/2025 2238 by Adriana Soto RN  Outcome: Progressing  Flowsheets (Taken 3/16/2025 1031 by Makeda Rasmussen, RN)  Skin Integrity Remains Intact: Monitor for areas of redness and/or skin breakdown     Problem: ABCDS Injury Assessment  Goal: Absence of physical injury  3/16/2025 2238 by Adriana Soto RN  Outcome: Progressing  Flowsheets (Taken 3/16/2025 1031 by Makeda Rasmussen, RN)  Absence of Physical Injury: Implement safety measures based on patient 
  Problem: Chronic Conditions and Co-morbidities  Goal: Patient's chronic conditions and co-morbidity symptoms are monitored and maintained or improved  3/17/2025 1233 by Makeda Rasmussen RN  Outcome: Progressing  Flowsheets (Taken 3/17/2025 1233)  Care Plan - Patient's Chronic Conditions and Co-Morbidity Symptoms are Monitored and Maintained or Improved:   Monitor and assess patient's chronic conditions and comorbid symptoms for stability, deterioration, or improvement   Collaborate with multidisciplinary team to address chronic and comorbid conditions and prevent exacerbation or deterioration     Problem: Pain  Goal: Verbalizes/displays adequate comfort level or baseline comfort level  3/17/2025 1233 by Makeda Rasmussen, RN  Outcome: Progressing  Flowsheets (Taken 3/17/2025 1233)  Verbalizes/displays adequate comfort level or baseline comfort level:   Encourage patient to monitor pain and request assistance   Assess pain using appropriate pain scale     Problem: Skin/Tissue Integrity  Goal: Skin integrity remains intact  Description: 1.  Monitor for areas of redness and/or skin breakdown  2.  Assess vascular access sites hourly  3.  Every 4-6 hours minimum:  Change oxygen saturation probe site  4.  Every 4-6 hours:  If on nasal continuous positive airway pressure, respiratory therapy assess nares and determine need for appliance change or resting period  3/17/2025 1233 by Makeda Rasmussen, RN  Outcome: Progressing  Flowsheets (Taken 3/17/2025 1233)  Skin Integrity Remains Intact: Monitor for areas of redness and/or skin breakdown     Problem: Safety - Adult  Goal: Free from fall injury  3/17/2025 1233 by Makeda Rasmussen, RN  Outcome: Progressing  Flowsheets (Taken 3/17/2025 1233)  Free From Fall Injury: Instruct family/caregiver on patient safety     
  Problem: Chronic Conditions and Co-morbidities  Goal: Patient's chronic conditions and co-morbidity symptoms are monitored and maintained or improved  3/18/2025 1249 by Owen Hanley RN  Outcome: Progressing  3/18/2025 0306 by Uyen Shabazz RN  Outcome: Progressing     Problem: Pain  Goal: Verbalizes/displays adequate comfort level or baseline comfort level  3/18/2025 1249 by Owen Hanley RN  Outcome: Progressing  3/18/2025 0306 by Uyen Shabazz RN  Outcome: Progressing     Problem: Skin/Tissue Integrity  Goal: Skin integrity remains intact  Description: 1.  Monitor for areas of redness and/or skin breakdown  2.  Assess vascular access sites hourly  3.  Every 4-6 hours minimum:  Change oxygen saturation probe site  4.  Every 4-6 hours:  If on nasal continuous positive airway pressure, respiratory therapy assess nares and determine need for appliance change or resting period  3/18/2025 1249 by Owen Hanley RN  Outcome: Progressing  3/18/2025 0306 by Uyen Shabazz RN  Outcome: Progressing     Problem: ABCDS Injury Assessment  Goal: Absence of physical injury  Outcome: Progressing     Problem: Safety - Adult  Goal: Free from fall injury  Outcome: Progressing     
  Problem: Chronic Conditions and Co-morbidities  Goal: Patient's chronic conditions and co-morbidity symptoms are monitored and maintained or improved  3/18/2025 1250 by Owen Hanley RN  Outcome: Progressing  3/18/2025 1249 by Owen Hanley RN  Outcome: Progressing  3/18/2025 0306 by Uyen Shabazz RN  Outcome: Progressing     Problem: Pain  Goal: Verbalizes/displays adequate comfort level or baseline comfort level  3/18/2025 1250 by Owen Hanley RN  Outcome: Progressing  3/18/2025 1249 by Owen Hanley RN  Outcome: Progressing  3/18/2025 0306 by Uyen Shabazz RN  Outcome: Progressing     Problem: Skin/Tissue Integrity  Goal: Skin integrity remains intact  Description: 1.  Monitor for areas of redness and/or skin breakdown  2.  Assess vascular access sites hourly  3.  Every 4-6 hours minimum:  Change oxygen saturation probe site  4.  Every 4-6 hours:  If on nasal continuous positive airway pressure, respiratory therapy assess nares and determine need for appliance change or resting period  3/18/2025 1250 by Owen Hanley RN  Outcome: Progressing  3/18/2025 1249 by Owen Hanley RN  Outcome: Progressing  3/18/2025 0306 by Uyen Shabazz RN  Outcome: Progressing     Problem: ABCDS Injury Assessment  Goal: Absence of physical injury  3/18/2025 1250 by Owen Hanley RN  Outcome: Progressing  3/18/2025 1249 by Owen Hanley RN  Outcome: Progressing     Problem: Safety - Adult  Goal: Free from fall injury  3/18/2025 1250 by Owen Hanley RN  Outcome: Progressing  3/18/2025 1249 by Owen Hanley RN  Outcome: Progressing     
  Problem: Chronic Conditions and Co-morbidities  Goal: Patient's chronic conditions and co-morbidity symptoms are monitored and maintained or improved  Outcome: Progressing     Problem: Pain  Goal: Verbalizes/displays adequate comfort level or baseline comfort level  Outcome: Progressing     Problem: Skin/Tissue Integrity  Goal: Skin integrity remains intact  Description: 1.  Monitor for areas of redness and/or skin breakdown  2.  Assess vascular access sites hourly  3.  Every 4-6 hours minimum:  Change oxygen saturation probe site  4.  Every 4-6 hours:  If on nasal continuous positive airway pressure, respiratory therapy assess nares and determine need for appliance change or resting period  Outcome: Progressing     
Increase patients ADLs/functional status to baseline.    
3/16/2025 0314)  Free From Fall Injury:   Instruct family/caregiver on patient safety   Based on caregiver fall risk screen, instruct family/caregiver to ask for assistance with transferring infant if caregiver noted to have fall risk factors